# Patient Record
Sex: FEMALE | Race: BLACK OR AFRICAN AMERICAN | NOT HISPANIC OR LATINO | URBAN - METROPOLITAN AREA
[De-identification: names, ages, dates, MRNs, and addresses within clinical notes are randomized per-mention and may not be internally consistent; named-entity substitution may affect disease eponyms.]

---

## 2017-04-17 ENCOUNTER — EMERGENCY (EMERGENCY)
Facility: HOSPITAL | Age: 47
LOS: 0 days | Discharge: HOME | End: 2017-04-18
Attending: EMERGENCY MEDICINE | Admitting: EMERGENCY MEDICINE
Payer: MEDICAID

## 2017-04-17 VITALS
OXYGEN SATURATION: 100 % | DIASTOLIC BLOOD PRESSURE: 63 MMHG | SYSTOLIC BLOOD PRESSURE: 135 MMHG | TEMPERATURE: 98 F | RESPIRATION RATE: 16 BRPM | HEART RATE: 91 BPM

## 2017-04-17 VITALS — HEIGHT: 62 IN | WEIGHT: 149.91 LBS

## 2017-04-17 LAB
ALBUMIN SERPL ELPH-MCNC: 3.9 G/DL — SIGNIFICANT CHANGE UP (ref 3.3–5)
ALP SERPL-CCNC: 69 U/L — SIGNIFICANT CHANGE UP (ref 40–120)
ALT FLD-CCNC: 17 U/L — SIGNIFICANT CHANGE UP (ref 12–78)
ANION GAP SERPL CALC-SCNC: 6 MMOL/L — SIGNIFICANT CHANGE UP (ref 5–17)
ANISOCYTOSIS BLD QL: SLIGHT — SIGNIFICANT CHANGE UP
APPEARANCE UR: CLEAR — SIGNIFICANT CHANGE UP
AST SERPL-CCNC: 15 U/L — SIGNIFICANT CHANGE UP (ref 15–37)
BACTERIA # UR AUTO: (no result)
BASOPHILS # BLD AUTO: 0.1 K/UL — SIGNIFICANT CHANGE UP (ref 0–0.2)
BASOPHILS NFR BLD AUTO: 1 % — SIGNIFICANT CHANGE UP (ref 0–2)
BILIRUB SERPL-MCNC: 0.2 MG/DL — SIGNIFICANT CHANGE UP (ref 0.2–1.2)
BILIRUB UR-MCNC: NEGATIVE — SIGNIFICANT CHANGE UP
BUN SERPL-MCNC: 15 MG/DL — SIGNIFICANT CHANGE UP (ref 7–23)
CALCIUM SERPL-MCNC: 9.1 MG/DL — SIGNIFICANT CHANGE UP (ref 8.5–10.1)
CHLORIDE SERPL-SCNC: 105 MMOL/L — SIGNIFICANT CHANGE UP (ref 96–108)
CK SERPL-CCNC: 89 U/L — SIGNIFICANT CHANGE UP (ref 26–192)
CO2 SERPL-SCNC: 30 MMOL/L — SIGNIFICANT CHANGE UP (ref 22–31)
COLOR SPEC: YELLOW — SIGNIFICANT CHANGE UP
CREAT SERPL-MCNC: 0.85 MG/DL — SIGNIFICANT CHANGE UP (ref 0.5–1.3)
DIFF PNL FLD: (no result)
EOSINOPHIL # BLD AUTO: 0 K/UL — SIGNIFICANT CHANGE UP (ref 0–0.5)
EOSINOPHIL NFR BLD AUTO: 0.5 % — SIGNIFICANT CHANGE UP (ref 0–6)
EPI CELLS # UR: SIGNIFICANT CHANGE UP
GLUCOSE SERPL-MCNC: 183 MG/DL — HIGH (ref 70–99)
GLUCOSE UR QL: 100 MG/DL
HCT VFR BLD CALC: 36.4 % — SIGNIFICANT CHANGE UP (ref 34.5–45)
HGB BLD-MCNC: 11.4 G/DL — LOW (ref 11.5–15.5)
KETONES UR-MCNC: NEGATIVE — SIGNIFICANT CHANGE UP
LEUKOCYTE ESTERASE UR-ACNC: (no result)
LIDOCAIN IGE QN: 201 U/L — SIGNIFICANT CHANGE UP (ref 73–393)
LYMPHOCYTES # BLD AUTO: 1.3 K/UL — SIGNIFICANT CHANGE UP (ref 1–3.3)
LYMPHOCYTES # BLD AUTO: 23.4 % — SIGNIFICANT CHANGE UP (ref 13–44)
MANUAL DIF COMMENT BLD-IMP: SIGNIFICANT CHANGE UP
MCHC RBC-ENTMCNC: 26.3 PG — LOW (ref 27–34)
MCHC RBC-ENTMCNC: 31.3 GM/DL — LOW (ref 32–36)
MCV RBC AUTO: 84 FL — SIGNIFICANT CHANGE UP (ref 80–100)
MONOCYTES # BLD AUTO: 0.2 K/UL — SIGNIFICANT CHANGE UP (ref 0–0.9)
MONOCYTES NFR BLD AUTO: 4 % — SIGNIFICANT CHANGE UP (ref 2–14)
NEUTROPHILS # BLD AUTO: 3.9 K/UL — SIGNIFICANT CHANGE UP (ref 1.8–7.4)
NEUTROPHILS NFR BLD AUTO: 71.2 % — SIGNIFICANT CHANGE UP (ref 43–77)
NITRITE UR-MCNC: NEGATIVE — SIGNIFICANT CHANGE UP
PH UR: 8 — SIGNIFICANT CHANGE UP (ref 4.8–8)
PLAT MORPH BLD: NORMAL — SIGNIFICANT CHANGE UP
PLATELET # BLD AUTO: 212 K/UL — SIGNIFICANT CHANGE UP (ref 150–400)
POTASSIUM SERPL-MCNC: 3.9 MMOL/L — SIGNIFICANT CHANGE UP (ref 3.5–5.3)
POTASSIUM SERPL-SCNC: 3.9 MMOL/L — SIGNIFICANT CHANGE UP (ref 3.5–5.3)
PROT SERPL-MCNC: 7.6 GM/DL — SIGNIFICANT CHANGE UP (ref 6–8.3)
PROT UR-MCNC: NEGATIVE MG/DL — SIGNIFICANT CHANGE UP
RBC # BLD: 4.33 M/UL — SIGNIFICANT CHANGE UP (ref 3.8–5.2)
RBC # FLD: 12.2 % — SIGNIFICANT CHANGE UP (ref 10.3–14.5)
RBC BLD AUTO: (no result)
RBC CASTS # UR COMP ASSIST: SIGNIFICANT CHANGE UP /HPF (ref 0–4)
SODIUM SERPL-SCNC: 141 MMOL/L — SIGNIFICANT CHANGE UP (ref 135–145)
SP GR SPEC: 1.01 — SIGNIFICANT CHANGE UP (ref 1.01–1.02)
TROPONIN I SERPL-MCNC: <0.015 NG/ML — SIGNIFICANT CHANGE UP (ref 0.01–0.04)
UROBILINOGEN FLD QL: NEGATIVE MG/DL — SIGNIFICANT CHANGE UP
WBC # BLD: 5.5 K/UL — SIGNIFICANT CHANGE UP (ref 3.8–10.5)
WBC # FLD AUTO: 5.5 K/UL — SIGNIFICANT CHANGE UP (ref 3.8–10.5)
WBC UR QL: (no result)

## 2017-04-17 PROCEDURE — 71010: CPT | Mod: 26

## 2017-04-17 PROCEDURE — 76705 ECHO EXAM OF ABDOMEN: CPT | Mod: 26

## 2017-04-17 PROCEDURE — 93010 ELECTROCARDIOGRAM REPORT: CPT

## 2017-04-17 PROCEDURE — 99284 EMERGENCY DEPT VISIT MOD MDM: CPT | Mod: 25

## 2017-04-17 RX ORDER — FAMOTIDINE 10 MG/ML
20 INJECTION INTRAVENOUS ONCE
Refills: 0 | Status: COMPLETED | OUTPATIENT
Start: 2017-04-17 | End: 2017-04-17

## 2017-04-17 RX ORDER — SODIUM CHLORIDE 9 MG/ML
1000 INJECTION INTRAMUSCULAR; INTRAVENOUS; SUBCUTANEOUS ONCE
Refills: 0 | Status: COMPLETED | OUTPATIENT
Start: 2017-04-17 | End: 2017-04-17

## 2017-04-17 RX ORDER — SODIUM CHLORIDE 9 MG/ML
3 INJECTION INTRAMUSCULAR; INTRAVENOUS; SUBCUTANEOUS ONCE
Refills: 0 | Status: COMPLETED | OUTPATIENT
Start: 2017-04-17 | End: 2017-04-17

## 2017-04-17 RX ADMIN — SODIUM CHLORIDE 3 MILLILITER(S): 9 INJECTION INTRAMUSCULAR; INTRAVENOUS; SUBCUTANEOUS at 22:15

## 2017-04-17 RX ADMIN — FAMOTIDINE 20 MILLIGRAM(S): 10 INJECTION INTRAVENOUS at 22:15

## 2017-04-17 RX ADMIN — SODIUM CHLORIDE 1000 MILLILITER(S): 9 INJECTION INTRAMUSCULAR; INTRAVENOUS; SUBCUTANEOUS at 22:15

## 2017-04-17 NOTE — ED STATDOCS - GASTROINTESTINAL, MLM
abdomen soft, non-tender, and non-distended. Bowel sounds present. abdomen soft, +epigastric tenderness, and non-distended. Bowel sounds present.

## 2017-04-17 NOTE — ED STATDOCS - MEDICAL DECISION MAKING DETAILS
pt with abdominal pain described as burning, will do labs, us to r/o rubi, discussed with family  possible depression, pt is not suicidal or homicidal, pt to follow up with Tony.

## 2017-04-17 NOTE — ED STATDOCS - DETAILS:
I, Janiya Howard, performed the initial face to face bedside interview with this patient regarding history of present illness, review of symptoms and relevant past medical, social and family history.  I completed an independent physical examination.  I was the initial provider who evaluated this patient. The history, relevant review of systems, past medical and surgical history, medical decision making, and physical examination was documented by the scribe in my presence and I attest to the accuracy of the documentation.

## 2017-04-17 NOTE — ED STATDOCS - NS ED MD SCRIBE ATTENDING SCRIBE SECTIONS
HISTORY OF PRESENT ILLNESS/HIV/REVIEW OF SYSTEMS/PHYSICAL EXAM/RESULTS/PROGRESS NOTE/CONSULTATIONS/SHIFT CHANGE/DISPOSITION/PAST MEDICAL/SURGICAL/SOCIAL HISTORY/INTAKE ASSESSMENT/SCREENINGS

## 2017-04-17 NOTE — ED STATDOCS - CHPI ED SYMPTOM NEG
no burning urination/no fever/no hematuria/no abdominal distention/no blood in stool/no diarrhea/no dysuria/no palpitations

## 2017-04-18 RX ORDER — FAMOTIDINE 10 MG/ML
1 INJECTION INTRAVENOUS
Qty: 20 | Refills: 0
Start: 2017-04-18 | End: 2017-04-28

## 2017-04-19 DIAGNOSIS — M79.671 PAIN IN RIGHT FOOT: ICD-10-CM

## 2017-04-19 DIAGNOSIS — M79.672 PAIN IN LEFT FOOT: ICD-10-CM

## 2017-04-19 DIAGNOSIS — R10.9 UNSPECIFIED ABDOMINAL PAIN: ICD-10-CM

## 2017-05-24 ENCOUNTER — RESULT REVIEW (OUTPATIENT)
Age: 47
End: 2017-05-24

## 2017-05-31 ENCOUNTER — OUTPATIENT (OUTPATIENT)
Dept: OUTPATIENT SERVICES | Facility: HOSPITAL | Age: 47
LOS: 1 days | End: 2017-05-31
Payer: COMMERCIAL

## 2017-05-31 ENCOUNTER — APPOINTMENT (OUTPATIENT)
Dept: MAMMOGRAPHY | Facility: CLINIC | Age: 47
End: 2017-05-31
Payer: MEDICAID

## 2017-05-31 DIAGNOSIS — Z00.8 ENCOUNTER FOR OTHER GENERAL EXAMINATION: ICD-10-CM

## 2017-05-31 PROCEDURE — 77067 SCR MAMMO BI INCL CAD: CPT

## 2017-05-31 PROCEDURE — 77063 BREAST TOMOSYNTHESIS BI: CPT | Mod: 26

## 2017-05-31 PROCEDURE — 77063 BREAST TOMOSYNTHESIS BI: CPT

## 2017-05-31 PROCEDURE — G0202: CPT | Mod: 26

## 2017-11-02 NOTE — ED STATDOCS - OBJECTIVE STATEMENT
46 y/o F PMHx HTN takes amlodipine, presents to the ED c/o abd pain. The pt's daughter provides that she has +N and abd pain, and has heart burn which started today at about 7pm. Eating increases pain. No h/o fever, chills, dizziness, cp, cough, sob, headache, or v/d. 701

## 2017-11-28 ENCOUNTER — RECORD ABSTRACTING (OUTPATIENT)
Age: 47
End: 2017-11-28

## 2017-11-28 DIAGNOSIS — R73.03 PREDIABETES.: ICD-10-CM

## 2017-11-28 DIAGNOSIS — I10 ESSENTIAL (PRIMARY) HYPERTENSION: ICD-10-CM

## 2017-11-28 DIAGNOSIS — Z87.19 PERSONAL HISTORY OF OTHER DISEASES OF THE DIGESTIVE SYSTEM: ICD-10-CM

## 2017-11-28 RX ORDER — AMLODIPINE BESYLATE 5 MG/1
5 TABLET ORAL DAILY
Refills: 0 | Status: DISCONTINUED | COMMUNITY
Start: 2017-11-28 | End: 2017-11-28

## 2017-11-30 ENCOUNTER — APPOINTMENT (OUTPATIENT)
Dept: HEMATOLOGY ONCOLOGY | Facility: CLINIC | Age: 47
End: 2017-11-30
Payer: MEDICAID

## 2017-12-12 ENCOUNTER — LABORATORY RESULT (OUTPATIENT)
Age: 47
End: 2017-12-12

## 2017-12-12 ENCOUNTER — APPOINTMENT (OUTPATIENT)
Dept: HEMATOLOGY ONCOLOGY | Facility: CLINIC | Age: 47
End: 2017-12-12
Payer: MEDICAID

## 2017-12-12 VITALS
TEMPERATURE: 98.2 F | WEIGHT: 115 LBS | HEIGHT: 63 IN | SYSTOLIC BLOOD PRESSURE: 146 MMHG | BODY MASS INDEX: 20.38 KG/M2 | HEART RATE: 77 BPM | DIASTOLIC BLOOD PRESSURE: 84 MMHG

## 2017-12-12 LAB
HCT VFR BLD CALC: 39.3 %
HGB BLD-MCNC: 12.4 G/DL
MCHC RBC-ENTMCNC: 26.8 PG
MCHC RBC-ENTMCNC: 31.6 GM/DL
MCV RBC AUTO: 84.8 FL
PLATELET # BLD AUTO: 172 K/UL
RBC # BLD: 4.63 M/UL
RBC # FLD: 12.1 %
WBC # FLD AUTO: 3.3 K/UL

## 2017-12-12 PROCEDURE — 99204 OFFICE O/P NEW MOD 45 MIN: CPT | Mod: 25

## 2017-12-12 PROCEDURE — 36415 COLL VENOUS BLD VENIPUNCTURE: CPT

## 2017-12-12 PROCEDURE — 85025 COMPLETE CBC W/AUTO DIFF WBC: CPT

## 2017-12-14 LAB
ALBUMIN SERPL ELPH-MCNC: 4.3 G/DL
ALP BLD-CCNC: 70 U/L
ALT SERPL-CCNC: 15 U/L
ANION GAP SERPL CALC-SCNC: 15 MMOL/L
AST SERPL-CCNC: 19 U/L
BILIRUB SERPL-MCNC: 0.3 MG/DL
BUN SERPL-MCNC: 13 MG/DL
CALCIUM SERPL-MCNC: 10 MG/DL
CHLORIDE SERPL-SCNC: 105 MMOL/L
CO2 SERPL-SCNC: 26 MMOL/L
CREAT SERPL-MCNC: 0.91 MG/DL
EBV EA AB SER IA-ACNC: <5 U/ML
EBV EA AB TITR SER IF: POSITIVE
EBV EA IGG SER QL IA: 226 U/ML
EBV EA IGG SER-ACNC: NEGATIVE
EBV EA IGM SER IA-ACNC: NEGATIVE
EBV PATRN SPEC IB-IMP: NORMAL
EBV VCA IGG SER IA-ACNC: <10 U/ML
EBV VCA IGM SER QL IA: 15 U/ML
EPSTEIN-BARR VIRUS CAPSID ANTIGEN IGG: NEGATIVE
GLUCOSE SERPL-MCNC: 111 MG/DL
HAV IGM SER QL: NONREACTIVE
HAV IGM SER QL: NONREACTIVE
HBV CORE IGG+IGM SER QL: NONREACTIVE
HBV CORE IGM SER QL: NONREACTIVE
HBV CORE IGM SER QL: NONREACTIVE
HBV SURFACE AB SER QL: NONREACTIVE
HBV SURFACE AG SER QL: NONREACTIVE
HBV SURFACE AG SER QL: NONREACTIVE
HCV AB SER QL: NONREACTIVE
HCV S/CO RATIO: 0.16 S/CO
POTASSIUM SERPL-SCNC: 4.1 MMOL/L
PROT SERPL-MCNC: 7.4 G/DL
SODIUM SERPL-SCNC: 146 MMOL/L

## 2017-12-15 LAB — HTLV I+II AB SER QL: NORMAL

## 2017-12-18 LAB — HUMAN IMMUNODEFICIENCY VIRUS 1 (HIV-1) QUALITATIVE, RNA: NEGATIVE

## 2018-01-03 ENCOUNTER — APPOINTMENT (OUTPATIENT)
Dept: HEMATOLOGY ONCOLOGY | Facility: CLINIC | Age: 48
End: 2018-01-03

## 2018-01-16 ENCOUNTER — EMERGENCY (EMERGENCY)
Facility: HOSPITAL | Age: 48
LOS: 0 days | Discharge: HOME | End: 2018-01-16
Attending: EMERGENCY MEDICINE | Admitting: EMERGENCY MEDICINE
Payer: MEDICAID

## 2018-01-16 VITALS
OXYGEN SATURATION: 100 % | DIASTOLIC BLOOD PRESSURE: 83 MMHG | HEART RATE: 84 BPM | TEMPERATURE: 98 F | RESPIRATION RATE: 16 BRPM | SYSTOLIC BLOOD PRESSURE: 136 MMHG

## 2018-01-16 VITALS
DIASTOLIC BLOOD PRESSURE: 83 MMHG | OXYGEN SATURATION: 100 % | SYSTOLIC BLOOD PRESSURE: 125 MMHG | TEMPERATURE: 98 F | RESPIRATION RATE: 18 BRPM | HEART RATE: 73 BPM

## 2018-01-16 DIAGNOSIS — R10.9 UNSPECIFIED ABDOMINAL PAIN: ICD-10-CM

## 2018-01-16 DIAGNOSIS — K21.9 GASTRO-ESOPHAGEAL REFLUX DISEASE WITHOUT ESOPHAGITIS: ICD-10-CM

## 2018-01-16 DIAGNOSIS — M54.12 RADICULOPATHY, CERVICAL REGION: ICD-10-CM

## 2018-01-16 LAB
ADD ON TEST-SPECIMEN IN LAB: SIGNIFICANT CHANGE UP
ALBUMIN SERPL ELPH-MCNC: 3.9 G/DL — SIGNIFICANT CHANGE UP (ref 3.3–5)
ALP SERPL-CCNC: 72 U/L — SIGNIFICANT CHANGE UP (ref 40–120)
ALT FLD-CCNC: 26 U/L — SIGNIFICANT CHANGE UP (ref 12–78)
ANION GAP SERPL CALC-SCNC: 9 MMOL/L — SIGNIFICANT CHANGE UP (ref 5–17)
APTT BLD: 35 SEC — SIGNIFICANT CHANGE UP (ref 27.5–37.4)
AST SERPL-CCNC: 15 U/L — SIGNIFICANT CHANGE UP (ref 15–37)
BASOPHILS # BLD AUTO: 0 K/UL — SIGNIFICANT CHANGE UP (ref 0–0.2)
BILIRUB SERPL-MCNC: 0.4 MG/DL — SIGNIFICANT CHANGE UP (ref 0.2–1.2)
BLD GP AB SCN SERPL QL: SIGNIFICANT CHANGE UP
BUN SERPL-MCNC: 16 MG/DL — SIGNIFICANT CHANGE UP (ref 7–23)
CALCIUM SERPL-MCNC: 9.2 MG/DL — SIGNIFICANT CHANGE UP (ref 8.5–10.1)
CHLORIDE SERPL-SCNC: 106 MMOL/L — SIGNIFICANT CHANGE UP (ref 96–108)
CK SERPL-CCNC: 91 U/L — SIGNIFICANT CHANGE UP (ref 26–192)
CO2 SERPL-SCNC: 28 MMOL/L — SIGNIFICANT CHANGE UP (ref 22–31)
CREAT SERPL-MCNC: 0.81 MG/DL — SIGNIFICANT CHANGE UP (ref 0.5–1.3)
EOSINOPHIL # BLD AUTO: 0 K/UL — SIGNIFICANT CHANGE UP (ref 0–0.5)
EOSINOPHIL NFR BLD AUTO: 2 % — SIGNIFICANT CHANGE UP (ref 0–6)
GLUCOSE SERPL-MCNC: 108 MG/DL — HIGH (ref 70–99)
HCT VFR BLD CALC: 39.5 % — SIGNIFICANT CHANGE UP (ref 34.5–45)
HGB BLD-MCNC: 12.5 G/DL — SIGNIFICANT CHANGE UP (ref 11.5–15.5)
INR BLD: 1.03 RATIO — SIGNIFICANT CHANGE UP (ref 0.88–1.16)
LIDOCAIN IGE QN: 181 U/L — SIGNIFICANT CHANGE UP (ref 73–393)
LYMPHOCYTES # BLD AUTO: 1.6 K/UL — SIGNIFICANT CHANGE UP (ref 1–3.3)
LYMPHOCYTES # BLD AUTO: 50 % — HIGH (ref 13–44)
MANUAL DIF COMMENT BLD-IMP: SIGNIFICANT CHANGE UP
MCHC RBC-ENTMCNC: 26.5 PG — LOW (ref 27–34)
MCHC RBC-ENTMCNC: 31.7 GM/DL — LOW (ref 32–36)
MCV RBC AUTO: 83.7 FL — SIGNIFICANT CHANGE UP (ref 80–100)
MONOCYTES # BLD AUTO: 0.2 K/UL — SIGNIFICANT CHANGE UP (ref 0–0.9)
MONOCYTES NFR BLD AUTO: 1 % — LOW (ref 2–14)
NEUTROPHILS # BLD AUTO: 1 K/UL — LOW (ref 1.8–7.4)
NEUTROPHILS NFR BLD AUTO: 42 % — LOW (ref 43–77)
PLAT MORPH BLD: NORMAL — SIGNIFICANT CHANGE UP
PLATELET # BLD AUTO: 213 K/UL — SIGNIFICANT CHANGE UP (ref 150–400)
POTASSIUM SERPL-MCNC: 3.7 MMOL/L — SIGNIFICANT CHANGE UP (ref 3.5–5.3)
POTASSIUM SERPL-SCNC: 3.7 MMOL/L — SIGNIFICANT CHANGE UP (ref 3.5–5.3)
PROT SERPL-MCNC: 8 GM/DL — SIGNIFICANT CHANGE UP (ref 6–8.3)
PROTHROM AB SERPL-ACNC: 11.1 SEC — SIGNIFICANT CHANGE UP (ref 9.8–12.7)
RBC # BLD: 4.72 M/UL — SIGNIFICANT CHANGE UP (ref 3.8–5.2)
RBC # FLD: 12.5 % — SIGNIFICANT CHANGE UP (ref 10.3–14.5)
RBC BLD AUTO: NORMAL — SIGNIFICANT CHANGE UP
SODIUM SERPL-SCNC: 143 MMOL/L — SIGNIFICANT CHANGE UP (ref 135–145)
TROPONIN I SERPL-MCNC: <0.015 NG/ML — SIGNIFICANT CHANGE UP (ref 0.01–0.04)
TYPE + AB SCN PNL BLD: SIGNIFICANT CHANGE UP
VARIANT LYMPHS # BLD: 5 % — SIGNIFICANT CHANGE UP (ref 0–6)
WBC # BLD: 3 K/UL — LOW (ref 3.8–10.5)
WBC # FLD AUTO: 3 K/UL — LOW (ref 3.8–10.5)

## 2018-01-16 PROCEDURE — 72125 CT NECK SPINE W/O DYE: CPT | Mod: 26

## 2018-01-16 PROCEDURE — 99222 1ST HOSP IP/OBS MODERATE 55: CPT

## 2018-01-16 PROCEDURE — 71045 X-RAY EXAM CHEST 1 VIEW: CPT | Mod: 26

## 2018-01-16 PROCEDURE — 93010 ELECTROCARDIOGRAM REPORT: CPT

## 2018-01-16 PROCEDURE — 70450 CT HEAD/BRAIN W/O DYE: CPT | Mod: 26

## 2018-01-16 PROCEDURE — 99285 EMERGENCY DEPT VISIT HI MDM: CPT

## 2018-01-16 RX ORDER — FAMOTIDINE 10 MG/ML
20 INJECTION INTRAVENOUS ONCE
Refills: 0 | Status: COMPLETED | OUTPATIENT
Start: 2018-01-16 | End: 2018-01-16

## 2018-01-16 RX ORDER — FAMOTIDINE 10 MG/ML
1 INJECTION INTRAVENOUS
Qty: 28 | Refills: 0
Start: 2018-01-16 | End: 2018-01-29

## 2018-01-16 RX ADMIN — FAMOTIDINE 20 MILLIGRAM(S): 10 INJECTION INTRAVENOUS at 11:02

## 2018-01-16 NOTE — ED PROVIDER NOTE - CONSTITUTIONAL, MLM
normal... Adult Black Female, Well appearing, well nourished, awake, alert, oriented to person, place, time/situation and in no apparent distress. Non toxic Adult Black Female, Well appearing, well nourished, awake, alert, oriented to person, place, time/situation and in no apparent respiratory distress. Non toxic

## 2018-01-16 NOTE — ED PROVIDER NOTE - ENMT, MLM
Airway patent, Nasal mucosa clear. Mouth with normal mucosa. Throat has no vesicles, no oropharyngeal exudates and uvula is midline. Airway patent, Nasal mucosa clear. Mouth with mildly dry mucosa. Throat has no vesicles, no oropharyngeal exudates and uvula is midline. Neck none tender. AFROM without SX

## 2018-01-16 NOTE — ED PROVIDER NOTE - OBJECTIVE STATEMENT
47 y-o F with PMHX of HTN presents to the ED c/o of ABD pain / weakness. Pt notes a sudden onset of burning ABD pain at 8 am this morning. Pt states she felt a mild HA last night and this morning RUE and B/L LEs feeling weak. Weakness is intermittent and has felt this before. Not currently having a HA. Denies CP, SOB, N/V/D, PMD Irlanda Granger. Pt poor historian, Hx obtained from Son at bedside. 47 y-o F with PMHX of HTN presents to the ED c/o of ABD pain / weakness. Pt notes a sudden onset of burning ABD pain at 8 am this morning. Pt states she felt a mild HA last night and this morning RUE and B/L LEs feeling weak. Weakness is intermittent and has felt this before x 3 months. Not currently having a HA. Has had piror vertigo episodes, none recently.  Denies CP, SOB, N/V/D, PMD Irlanda Granger. Pt poor historian, Hx obtained from Son at bedside.

## 2018-01-16 NOTE — ED PROVIDER NOTE - PROGRESS NOTE DETAILS
Marcella Maza: Pt with Mendoza last night, not sever, awoke this morning 6 am, noted right arm weakness intermittent of same before, Pt is not code stroke candidate due to Sx greater then 48 hours. Marcella Maza: Pt with Mendoza last night, not severe, awoke this morning 6 am, noted right arm weakness intermittent of same before, Pt is not code stroke candidate due to Sx greater then 48 hours. Neuro PA at bedside. Dr. Graham:  Reevaluated patient at bedside.  Patient feeling much improved: no focal arm weak/numb, no abd discomfort, + tolerating po well, CT c-spine no acute pathology.  Discussed the results of all diagnostic testing in ED and copies of all reports given.   An opportunity to ask questions was given.  Discussed the importance of prompt, close medical follow-up.  Patient will return with any changes, concerns or persistent / worsening symptoms.  Understanding of all instructions verbalized.

## 2018-01-16 NOTE — ED ADULT TRIAGE NOTE - CHIEF COMPLAINT QUOTE
c/o abdominal "burn", dizziness, right sided weakness since 6 am this morning, body ache. Denies vomiting diarrhea. hx HTN. No facial droop noted, patient ambulating at this time.

## 2018-01-16 NOTE — ED PROVIDER NOTE - MEDICAL DECISION MAKING DETAILS
47 y-o F presents with RUE weakness and B/L LE weakness. Weakness has been intermittent x 3months  Pt c/o burning pain sensation in her ABD.   Plan: Labs, Pepcid, Monitor, Ct Brain Stroke call work up, CXR, EKG, observe and reassess.

## 2018-01-16 NOTE — ED PROVIDER NOTE - DIAGNOSIS COUNSELING, MDM
conducted a detailed discussion... I had a detailed discussion with the patient and son regarding the historical points, exam findings, and any diagnostic results supporting the discharge diagnosis.

## 2018-01-16 NOTE — CONSULT NOTE ADULT - SUBJECTIVE AND OBJECTIVE BOX
CC: Stomach pain, tongue burning, right arm and bilateral leg weakness     HPI: Pt is a Moroccan-Creole speaking 47 year old woman with a PMH of HTN who presented to the ED with stomach pains, a burning tongue and RUE weakness. Her son was at bedside and available for translation. As per the patient she has had these symptoms for some time but today she didn't feel "well" so her son brought her to the ED.  The patient gives a history of right hand numbness with pain in her neck, right shoulder and right arm. She also c/o bilateral lower extremity weakness. Pt denies any recent illness.     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      Vital Signs Last 24 Hrs  T(C): 36.7 (16 Jan 2018 09:10), Max: 36.7 (16 Jan 2018 09:10)  T(F): 98.1 (16 Jan 2018 09:10), Max: 98.1 (16 Jan 2018 09:10)  HR: 84 (16 Jan 2018 09:10) (84 - 84)  BP: 136/83 (16 Jan 2018 09:10) (136/83 - 136/83)  RR: 16 (16 Jan 2018 09:10) (16 - 16)  SpO2: 100% (16 Jan 2018 09:10) (100% - 100%)    PHYSICAL EXAM:  Constitutional: awake and alert  HEENT: PERRLA, EOMI  Neck: Supple  Respiratory: Breath sounds are clear bilaterally  Cardiovascular: S1 and S2, regular rhythm  Gastrointestinal: soft, nontender  Extremities:  no edema  Vascular: Carotid Bruit - no  Musculoskeletal: no joint swelling/tenderness, no abnormal movements  Skin: No rashes    Neurological exam:  HF: A x O x 3. Appropriately interactive, Pt speaks Moroccan/Creole- intact as per son   CN: JONO, EOMI, VFF, facial sensation normal, no NLFD, tongue midline, Palate moves equally, SCM equal bilaterally  Motor: No pronator drift, Strength 5/5 in all 4 ext, normal bulk and tone, no tremor, rigidity or bradykinesia.    Sens: Intact to light touch / PP/ VS/ JS    Reflexes: Symmetric and normal . BJ 2+, BR 2+, KJ 2+, AJ 2+, downgoing toes b/l  Coord:  No FNFA, dysmetria, BLAS intact   Gait/Balance: Normal/Cannot test    NIHSS: 0    Labs:                        12.5   3.0   )-----------( 213      ( 16 Jan 2018 09:28 )             39.5     01-16    143  |  106  |  16  ---------------------<  108<H>  3.7   |  28  |  0.81    Ca    9.2      16 Jan 2018 09:28    TPro  8.0  /  Alb  3.9  /  TBili  0.4  /  DBili  x   /  AST  15  /  ALT  26  /  AlkPhos  72  01-16    PT/INR - ( 16 Jan 2018 09:28 )   PT: 11.1 sec;   INR: 1.03 ratio      PTT - ( 16 Jan 2018 09:28 )  PTT:35.0 sec    RADIOLOGY:  CT HEAD-  The brain demonstrates no abnormal attenuation.     No acute cerebral cortical infarct is seen.    No intracranial hemorrhage is found.    No mass effect is found in the brain. CC: Stomach pain, tongue burning, right arm and bilateral leg weakness     HPI: 47 year old Albanian-Creole speaking woman with a PMH of HTN presented to the ED with stomach pains, a burning tongue and RUE weakness,. Her son was at bedside is available for translation. As per the patient she has had these symptoms for some time but today she didn't feel "well" so her son brought her to the ED.  The patient gives a history of right hand numbness with pain neck, radiates to right shoulder and right arm. She also c/o bilateral lower extremity weakness off and on; initially Code stroke called by ED physician due to right arm numbness/weakness; sx resolved, pt had non-focal exam, hence Code stroke d/c.    Pt denies any recent illness, trauma to neck, visual or speech disturbance.     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      Vital Signs Last 24 Hrs  T(C): 36.7 (16 Jan 2018 09:10), Max: 36.7 (16 Jan 2018 09:10)  T(F): 98.1 (16 Jan 2018 09:10), Max: 98.1 (16 Jan 2018 09:10)  HR: 84 (16 Jan 2018 09:10) (84 - 84)  BP: 136/83 (16 Jan 2018 09:10) (136/83 - 136/83)  RR: 16 (16 Jan 2018 09:10) (16 - 16)  SpO2: 100% (16 Jan 2018 09:10) (100% - 100%)    PHYSICAL EXAM:  Constitutional: awake and alert  HEENT: PERRLA, EOMI  Neck: Supple  Respiratory: Breath sounds are clear bilaterally  Cardiovascular: S1 and S2, regular rhythm  Extremities:  no edema  Vascular: Carotid Bruit - no  Musculoskeletal: no joint swelling/tenderness, no abnormal movements  Skin: No rashes    Neurological exam:  HF: A x O x 3. Appropriately interactive, Pt speaks Albanian/Creole- intact as per son   CN: JONO, EOMI, VFF, facial sensation normal, no NLFD, tongue midline, Palate moves equally, SCM equal bilaterally  Motor: No pronator drift, Strength 5/5 in all 4 ext, normal bulk and tone, no tremor, rigidity or bradykinesia.    Sens: Intact to light touch / PP/ VS/ JS    Reflexes: Symmetric and normal .  downgoing toes b/l  Coord:  No FNFA, dysmetria   Gait/Balance: Not test    NIHSS: 0    Labs:                        12.5   3.0   )-----------( 213      ( 16 Jan 2018 09:28 )             39.5     01-16    143  |  106  |  16  ---------------------<  108<H>  3.7   |  28  |  0.81    Ca    9.2      16 Jan 2018 09:28    TPro  8.0  /  Alb  3.9  /  TBili  0.4  /  DBili  x   /  AST  15  /  ALT  26  /  AlkPhos  72  01-16    PT/INR - ( 16 Jan 2018 09:28 )   PT: 11.1 sec;   INR: 1.03 ratio      PTT - ( 16 Jan 2018 09:28 )  PTT:35.0 sec    RADIOLOGY:  CT HEAD-  The brain demonstrates no abnormal attenuation.     No acute cerebral cortical infarct is seen.    No intracranial hemorrhage is found.    No mass effect is found in the brain.

## 2018-01-16 NOTE — CONSULT NOTE ADULT - ASSESSMENT
Pt is a Syriac-Creole speaking 47 year old woman with a PMH of HTN who presented to the ED with stomach pains, a burning tongue and RUE weakness. The ED physician noticed some right hand weakness and called a code stroke at 9:44am- pt went for CT of the head which was negative for any acute stroke or hemorrhage.   Pt was seen and examined after her CT- no focal weakness noted. Pt gives no time of onset for the symptoms, states they have been going on for some time. Not a candidate for t-PA.    # TIA vs cervical radiculopathy  -not a t-PA candidate  -Recommend CT of the cervical spine 47 year old Tunisian-Creole speaking with PMH of HTN who presented to the ED with stomach pains, a burning tongue and RUE weakness; initially ED physician noticed some right hand weakness and called a code stroke at 9:44 am, CT of the head which was negative for any acute stroke or hemorrhage; pt was reexamined after her CT- no focal weakness noted, pt has had neck pain radiating to arms of chronic nature; at times radiates to both arms and has some sx in legs.    Pt was evaluated, not a candidate for t-PA; non-focal exam.    # TIA less likely.    # cervical radiculopathy vs peripheral neuropathy / CTS    -Recommend CT of the cervical spine   -labs: B12/Fol/Aic    NEUROLOGY ATTENDING  ------------------------------  Pt seen and examined.  Agree with above plan    D/W pt, her son and Dr. Graham

## 2018-01-16 NOTE — ED PROVIDER NOTE - CARE PLAN
Principal Discharge DX:	Gastroesophageal reflux disease, esophagitis presence not specified  Secondary Diagnosis:	Cervical radiculopathy, chronic

## 2018-01-23 ENCOUNTER — APPOINTMENT (OUTPATIENT)
Dept: HEMATOLOGY ONCOLOGY | Facility: CLINIC | Age: 48
End: 2018-01-23
Payer: MEDICAID

## 2018-01-23 VITALS
TEMPERATURE: 98.1 F | SYSTOLIC BLOOD PRESSURE: 131 MMHG | HEART RATE: 80 BPM | WEIGHT: 120 LBS | DIASTOLIC BLOOD PRESSURE: 83 MMHG | BODY MASS INDEX: 21.26 KG/M2 | HEIGHT: 63 IN

## 2018-01-23 DIAGNOSIS — K29.70 GASTRITIS, UNSPECIFIED, W/OUT BLEEDING: ICD-10-CM

## 2018-01-23 PROCEDURE — 99214 OFFICE O/P EST MOD 30 MIN: CPT

## 2018-01-23 RX ORDER — NAPROXEN 250 MG/1
250 TABLET ORAL
Qty: 28 | Refills: 0 | Status: DISCONTINUED | COMMUNITY
Start: 2017-08-22

## 2018-01-23 RX ORDER — RANITIDINE 150 MG/1
150 TABLET ORAL
Qty: 30 | Refills: 0 | Status: DISCONTINUED | COMMUNITY
Start: 2017-07-21

## 2018-01-23 RX ORDER — POLYETHYLENE GLYCOL 3350 17 G/17G
17 POWDER, FOR SOLUTION ORAL
Qty: 238 | Refills: 0 | Status: DISCONTINUED | COMMUNITY
Start: 2017-07-21

## 2018-01-23 RX ORDER — FAMOTIDINE 20 MG/1
20 TABLET, FILM COATED ORAL
Qty: 28 | Refills: 0 | Status: DISCONTINUED | COMMUNITY
Start: 2018-01-16 | End: 2018-01-23

## 2018-06-05 ENCOUNTER — OUTPATIENT (OUTPATIENT)
Dept: OUTPATIENT SERVICES | Facility: HOSPITAL | Age: 48
LOS: 1 days | End: 2018-06-05
Payer: COMMERCIAL

## 2018-06-05 ENCOUNTER — APPOINTMENT (OUTPATIENT)
Dept: MAMMOGRAPHY | Facility: CLINIC | Age: 48
End: 2018-06-05
Payer: MEDICAID

## 2018-06-05 DIAGNOSIS — Z00.8 ENCOUNTER FOR OTHER GENERAL EXAMINATION: ICD-10-CM

## 2018-06-05 PROCEDURE — 77067 SCR MAMMO BI INCL CAD: CPT

## 2018-06-05 PROCEDURE — 77067 SCR MAMMO BI INCL CAD: CPT | Mod: 26

## 2018-06-05 PROCEDURE — 77063 BREAST TOMOSYNTHESIS BI: CPT

## 2018-06-05 PROCEDURE — 77063 BREAST TOMOSYNTHESIS BI: CPT | Mod: 26

## 2018-06-11 ENCOUNTER — APPOINTMENT (OUTPATIENT)
Dept: CARDIOLOGY | Facility: CLINIC | Age: 48
End: 2018-06-11
Payer: COMMERCIAL

## 2018-06-11 DIAGNOSIS — R01.1 CARDIAC MURMUR, UNSPECIFIED: ICD-10-CM

## 2018-06-11 PROCEDURE — 93000 ELECTROCARDIOGRAM COMPLETE: CPT

## 2018-06-11 PROCEDURE — 99204 OFFICE O/P NEW MOD 45 MIN: CPT

## 2018-07-02 ENCOUNTER — FORM ENCOUNTER (OUTPATIENT)
Age: 48
End: 2018-07-02

## 2018-07-03 ENCOUNTER — OUTPATIENT (OUTPATIENT)
Dept: OUTPATIENT SERVICES | Facility: HOSPITAL | Age: 48
LOS: 1 days | Discharge: HOME | End: 2018-07-03
Payer: MEDICAID

## 2018-07-03 DIAGNOSIS — R01.1 CARDIAC MURMUR, UNSPECIFIED: ICD-10-CM

## 2018-07-03 DIAGNOSIS — R07.9 CHEST PAIN, UNSPECIFIED: ICD-10-CM

## 2018-07-03 PROCEDURE — 93016 CV STRESS TEST SUPVJ ONLY: CPT

## 2018-07-03 PROCEDURE — 93306 TTE W/DOPPLER COMPLETE: CPT | Mod: 26

## 2018-07-03 PROCEDURE — 93018 CV STRESS TEST I&R ONLY: CPT

## 2018-08-06 VITALS
BODY MASS INDEX: 21.97 KG/M2 | WEIGHT: 124 LBS | HEART RATE: 90 BPM | OXYGEN SATURATION: 100 % | DIASTOLIC BLOOD PRESSURE: 95 MMHG | SYSTOLIC BLOOD PRESSURE: 156 MMHG | HEIGHT: 63 IN

## 2018-08-06 RX ORDER — OMEPRAZOLE 20 MG/1
20 CAPSULE, DELAYED RELEASE ORAL
Qty: 30 | Refills: 0 | Status: DISCONTINUED | COMMUNITY
Start: 2017-08-09 | End: 2018-08-06

## 2019-01-24 NOTE — ED PROVIDER NOTE - NIH STROKE SCALE: 6A. MOTOR LEG, LEFT, QM
Detail Level: Zone Plan: If lesions persist after liquid nitrogen, okay to use Efudex as a spot treatment- has prescription at home (0) No drift; leg holds 30 degree position for full 5 secs

## 2019-02-08 ENCOUNTER — RESULT REVIEW (OUTPATIENT)
Age: 49
End: 2019-02-08

## 2019-04-08 ENCOUNTER — EMERGENCY (EMERGENCY)
Facility: HOSPITAL | Age: 49
LOS: 0 days | Discharge: HOME | End: 2019-04-08
Attending: EMERGENCY MEDICINE | Admitting: EMERGENCY MEDICINE
Payer: COMMERCIAL

## 2019-04-08 VITALS
TEMPERATURE: 98 F | RESPIRATION RATE: 17 BRPM | DIASTOLIC BLOOD PRESSURE: 70 MMHG | SYSTOLIC BLOOD PRESSURE: 135 MMHG | HEART RATE: 80 BPM | OXYGEN SATURATION: 97 %

## 2019-04-08 VITALS — WEIGHT: 145.06 LBS | HEIGHT: 64 IN

## 2019-04-08 DIAGNOSIS — I10 ESSENTIAL (PRIMARY) HYPERTENSION: ICD-10-CM

## 2019-04-08 DIAGNOSIS — R20.2 PARESTHESIA OF SKIN: ICD-10-CM

## 2019-04-08 LAB
ALBUMIN SERPL ELPH-MCNC: 4.1 G/DL — SIGNIFICANT CHANGE UP (ref 3.3–5)
ALP SERPL-CCNC: 89 U/L — SIGNIFICANT CHANGE UP (ref 40–120)
ALT FLD-CCNC: 29 U/L — SIGNIFICANT CHANGE UP (ref 12–78)
ANION GAP SERPL CALC-SCNC: 7 MMOL/L — SIGNIFICANT CHANGE UP (ref 5–17)
APPEARANCE UR: CLEAR — SIGNIFICANT CHANGE UP
AST SERPL-CCNC: 27 U/L — SIGNIFICANT CHANGE UP (ref 15–37)
BASOPHILS # BLD AUTO: 0.02 K/UL — SIGNIFICANT CHANGE UP (ref 0–0.2)
BASOPHILS NFR BLD AUTO: 0.5 % — SIGNIFICANT CHANGE UP (ref 0–2)
BILIRUB SERPL-MCNC: 0.3 MG/DL — SIGNIFICANT CHANGE UP (ref 0.2–1.2)
BILIRUB UR-MCNC: NEGATIVE — SIGNIFICANT CHANGE UP
BUN SERPL-MCNC: 12 MG/DL — SIGNIFICANT CHANGE UP (ref 7–23)
CALCIUM SERPL-MCNC: 9.6 MG/DL — SIGNIFICANT CHANGE UP (ref 8.5–10.1)
CHLORIDE SERPL-SCNC: 107 MMOL/L — SIGNIFICANT CHANGE UP (ref 96–108)
CO2 SERPL-SCNC: 29 MMOL/L — SIGNIFICANT CHANGE UP (ref 22–31)
COLOR SPEC: YELLOW — SIGNIFICANT CHANGE UP
CREAT SERPL-MCNC: 0.91 MG/DL — SIGNIFICANT CHANGE UP (ref 0.5–1.3)
DIFF PNL FLD: ABNORMAL
EOSINOPHIL # BLD AUTO: 0.02 K/UL — SIGNIFICANT CHANGE UP (ref 0–0.5)
EOSINOPHIL NFR BLD AUTO: 0.5 % — SIGNIFICANT CHANGE UP (ref 0–6)
EPI CELLS # UR: SIGNIFICANT CHANGE UP
GLUCOSE SERPL-MCNC: 124 MG/DL — HIGH (ref 70–99)
GLUCOSE UR QL: NEGATIVE MG/DL — SIGNIFICANT CHANGE UP
HCT VFR BLD CALC: 40.8 % — SIGNIFICANT CHANGE UP (ref 34.5–45)
HGB BLD-MCNC: 13.2 G/DL — SIGNIFICANT CHANGE UP (ref 11.5–15.5)
IMM GRANULOCYTES NFR BLD AUTO: 0 % — SIGNIFICANT CHANGE UP (ref 0–1.5)
KETONES UR-MCNC: NEGATIVE — SIGNIFICANT CHANGE UP
LEUKOCYTE ESTERASE UR-ACNC: ABNORMAL
LYMPHOCYTES # BLD AUTO: 2.12 K/UL — SIGNIFICANT CHANGE UP (ref 1–3.3)
LYMPHOCYTES # BLD AUTO: 48.6 % — HIGH (ref 13–44)
MAGNESIUM SERPL-MCNC: 2.1 MG/DL — SIGNIFICANT CHANGE UP (ref 1.6–2.6)
MCHC RBC-ENTMCNC: 27.2 PG — SIGNIFICANT CHANGE UP (ref 27–34)
MCHC RBC-ENTMCNC: 32.4 GM/DL — SIGNIFICANT CHANGE UP (ref 32–36)
MCV RBC AUTO: 84 FL — SIGNIFICANT CHANGE UP (ref 80–100)
MONOCYTES # BLD AUTO: 0.23 K/UL — SIGNIFICANT CHANGE UP (ref 0–0.9)
MONOCYTES NFR BLD AUTO: 5.3 % — SIGNIFICANT CHANGE UP (ref 2–14)
NEUTROPHILS # BLD AUTO: 1.97 K/UL — SIGNIFICANT CHANGE UP (ref 1.8–7.4)
NEUTROPHILS NFR BLD AUTO: 45.1 % — SIGNIFICANT CHANGE UP (ref 43–77)
NITRITE UR-MCNC: NEGATIVE — SIGNIFICANT CHANGE UP
NRBC # BLD: 0 /100 WBCS — SIGNIFICANT CHANGE UP (ref 0–0)
PH UR: 8 — SIGNIFICANT CHANGE UP (ref 5–8)
PLATELET # BLD AUTO: 235 K/UL — SIGNIFICANT CHANGE UP (ref 150–400)
POTASSIUM SERPL-MCNC: 4.1 MMOL/L — SIGNIFICANT CHANGE UP (ref 3.5–5.3)
POTASSIUM SERPL-SCNC: 4.1 MMOL/L — SIGNIFICANT CHANGE UP (ref 3.5–5.3)
PROT SERPL-MCNC: 8.6 GM/DL — HIGH (ref 6–8.3)
PROT UR-MCNC: NEGATIVE MG/DL — SIGNIFICANT CHANGE UP
RBC # BLD: 4.86 M/UL — SIGNIFICANT CHANGE UP (ref 3.8–5.2)
RBC # FLD: 13.1 % — SIGNIFICANT CHANGE UP (ref 10.3–14.5)
RBC CASTS # UR COMP ASSIST: ABNORMAL /HPF (ref 0–4)
SODIUM SERPL-SCNC: 143 MMOL/L — SIGNIFICANT CHANGE UP (ref 135–145)
SP GR SPEC: 1.01 — SIGNIFICANT CHANGE UP (ref 1.01–1.02)
TROPONIN I SERPL-MCNC: <0.015 NG/ML — SIGNIFICANT CHANGE UP (ref 0.01–0.04)
UROBILINOGEN FLD QL: NEGATIVE MG/DL — SIGNIFICANT CHANGE UP
WBC # BLD: 4.36 K/UL — SIGNIFICANT CHANGE UP (ref 3.8–10.5)
WBC # FLD AUTO: 4.36 K/UL — SIGNIFICANT CHANGE UP (ref 3.8–10.5)
WBC UR QL: SIGNIFICANT CHANGE UP

## 2019-04-08 PROCEDURE — 71046 X-RAY EXAM CHEST 2 VIEWS: CPT | Mod: 26

## 2019-04-08 PROCEDURE — 70450 CT HEAD/BRAIN W/O DYE: CPT | Mod: 26

## 2019-04-08 PROCEDURE — 93010 ELECTROCARDIOGRAM REPORT: CPT

## 2019-04-08 PROCEDURE — 99285 EMERGENCY DEPT VISIT HI MDM: CPT | Mod: 25

## 2019-04-08 NOTE — ED STATDOCS - PROGRESS NOTE DETAILS
signed Amanda Esposito PA-C Pt seen initially in intake by Dr Marshall.  ID [id number] Ora Woodward.   48F c/o right cheek "cramping" since last night and right sided intermittent weakness x 1 year which pt has seen her doctor for and has had outpt tests. pt also has a business card from a cardiologist in [facility] who she states she saw in december who said she needs to see a neurologist but she hasn't seen one yet. Pt denies CP/SOB/confusion/difficulty walking. pt felt like her BP was high today, took her 5mg amplodipine this morning. GCS 15. Pt alert, NAD, ambulates with ease in ED. normocephalic atraumatic. no spinal tenderness. CN II-XII grossly intact. neg romberg, neg pronator drift, normal gait including tandem. pupils PERRL 2mm bilat, EOMI. no gross visual field deficit. no dysmetria, neg finger to nose, heel to shin. Plan labs, CT head, EKG, cxr, re-eval. Pt agrees with plan of  care. PMD Irlanda Granger signed Amanda Esposito PA-C  ID [id number] signed Amanda Esposito PA-C  ID [id number]  No significant findings on labs, imaging, or EKG. pt with normal neuro exam in ED. recommend outpt f/u neuro. 1 trop drawn since symptoms started last night. Pt asking about additional testing and whther her medication is causing her symptoms. Advise pt to f/u with neuro and PMD. return precautions given. pt given copy of labwork and imaging results. Pt ambulates with ease unassisted in ED. Pt feeling well at DC, agrees with DC and plan of care.

## 2019-04-08 NOTE — ED STATDOCS - PROVIDER TOKENS
PROVIDER:[TOKEN:[[id number]],FOLLOWUP:[1-3 Days]],PROVIDER:[TOKEN:[[id number]],FOLLOWUP:[1-3 Days]]

## 2019-04-08 NOTE — ED STATDOCS - NEUROLOGICAL, MLM
sensation is normal and strength is normal. Cranial Nerves II-XII intact. No pronator drift. Normal finger-nose-finger. No limb ataxia

## 2019-04-08 NOTE — ED ADULT TRIAGE NOTE - CHIEF COMPLAINT QUOTE
patient checked bp and thought pressure was high bp in triage 162/96 HR 89.  Patient had been compliant with amlodipine 5mg.  She reports a "cramp" on the right side of her face, that started this morning.  denies HA.  smile equal, tongue midline

## 2019-04-08 NOTE — ED STATDOCS - CARE PROVIDERS DIRECT ADDRESSES
[email or web address]

## 2019-04-08 NOTE — ED STATDOCS - CARE PROVIDER_API CALL
Sam Lacey)  Neurology  [address] [place] [address], [address]  [place] [address]  Phone: [phone]  Fax: [phone]  Follow Up Time: 1-3 Days    Arti Diaz)  Clinical Neurophysiology; EEGEpilepsy; Neurology; Sleep Medicine  [address] [place] [address], [address] [place] [address]  Phone: [phone]  Fax: [phone]  Follow Up Time: 1-3 Days

## 2019-04-08 NOTE — ED STATDOCS - CLINICAL SUMMARY MEDICAL DECISION MAKING FREE TEXT BOX
49 y/o F with right sided facial cramping. Plan: cardiac workup, follow up with PMD to consider increasing Amlodipine, reassess.

## 2019-04-08 NOTE — ED STATDOCS - ATTENDING CONTRIBUTION TO CARE
I, Lane Marshall, performed the initial face to face bedside interview with this patient regarding history of present illness, review of symptoms and relevant past medical, social and family history.  I completed an independent physical examination.  I was the initial provider who evaluated this patient. I have signed out the follow up of any pending tests (i.e. labs, radiological studies) to the ACP.  I have communicated the patient’s plan of care and disposition with the ACP.  The history, relevant review of systems, past medical and surgical history, medical decision making, and physical examination was documented by the scribe in my presence and I attest to the accuracy of the documentation.

## 2019-04-08 NOTE — ED STATDOCS - OBJECTIVE STATEMENT
49 y/o F with PMHx of HTN, on Amlodipine 5mg, presenting to the ED with daughter c/o right-sided facial "cramping," since last night. Pt speaks Berm[place]ian Creole and HPI obtained with assistance of daughter at bedside and [place]  #[id number]. Pt also notes intermittent right-sided weakness x1 year. Pt has been evaluated by PMD for this issue with normal work-up, per patient. CT Brain Stroke on 01/16/2018 was unremarkable. No facial droop, visual changes, slurred speech, HA, CP, SOB, abd pain, N/V/D, fevers, or chills. /96 in ED triage vitals. No recent changes to HTN medications. No PSHx. NKDA. PMD: Dr. Irlanda Drummond.

## 2019-04-26 ENCOUNTER — APPOINTMENT (OUTPATIENT)
Dept: NEUROLOGY | Facility: CLINIC | Age: 49
End: 2019-04-26
Payer: COMMERCIAL

## 2019-04-26 VITALS
BODY MASS INDEX: 21.79 KG/M2 | HEART RATE: 94 BPM | HEIGHT: 63 IN | WEIGHT: 123 LBS | DIASTOLIC BLOOD PRESSURE: 78 MMHG | SYSTOLIC BLOOD PRESSURE: 156 MMHG

## 2019-04-26 DIAGNOSIS — M54.2 CERVICALGIA: ICD-10-CM

## 2019-04-26 PROCEDURE — 99214 OFFICE O/P EST MOD 30 MIN: CPT

## 2019-04-26 RX ORDER — ALUMINUM HYDROXIDE AND MAGNESIUM TRISILICATE 80; 14.2 MG/1; MG/1
TABLET, CHEWABLE ORAL
Refills: 0 | Status: DISCONTINUED | COMMUNITY

## 2019-04-26 RX ORDER — RANITIDINE HYDROCHLORIDE 150 MG/1
150 CAPSULE ORAL
Refills: 0 | Status: DISCONTINUED | COMMUNITY

## 2019-04-26 RX ORDER — PANTOPRAZOLE 40 MG/1
40 TABLET, DELAYED RELEASE ORAL
Qty: 30 | Refills: 6 | Status: DISCONTINUED | COMMUNITY
Start: 2018-01-23 | End: 2019-04-26

## 2019-04-26 RX ORDER — LORATADINE 5 MG
17 TABLET,CHEWABLE ORAL
Refills: 0 | Status: DISCONTINUED | COMMUNITY

## 2019-04-26 RX ORDER — ESCITALOPRAM OXALATE 10 MG/1
10 TABLET ORAL
Qty: 30 | Refills: 0 | Status: DISCONTINUED | COMMUNITY
Start: 2017-07-29 | End: 2019-04-26

## 2019-04-26 NOTE — REVIEW OF SYSTEMS
[Depression] : depression [As Noted in HPI] : as noted in HPI [Eyesight Problems] : eyesight problems [Negative] : Genitourinary [FreeTextEntry7] : loss of appetite [de-identified] : headache, dizziness, imbalance [FreeTextEntry9] : cramps

## 2019-04-26 NOTE — HISTORY OF PRESENT ILLNESS
[FreeTextEntry1] : \par She reports having weakness in right arm and leg for about one year. She says that symptoms are constant. \par Sometimes she has numbness on the right face and body.\par Sometimes she has headaches and neck pain.\par The neck pain radiates into her shoulders bilaterally but does not seem to radiate down the arms.\par She is unable to report any exacerbating or alleviating factors. \par \par \par \par She went to the hospital on 4/8/19. Her son says that it was for the same reason as she describes above.\par From ED note on 4/8/19:\par 47 y/o F with PMHx of HTN, on Amlodipine 5 mg, presenting to the ED with daughter c/o right-sided facial "cramping," since last night. Pt speaks Nauruan Creole and HPI obtained with assistance of daughter at bedside and [place]  #[id number]. Pt also notes intermittent right-sided weakness x 1 year. Pt has been evaluated by PMD for this issue with normal work-up, per patient.

## 2019-04-26 NOTE — PHYSICAL EXAM
[FreeTextEntry1] : Examination:\par Constitutional: normal, no apparent distress\par Eyes: normal conjunctiva b/l, no ptosis, visual fields full\par Respiratory: no respiratory distress, normal effort, normal auscultation\par Cardiovascular: normal rate, rhythm, no murmurs\par Neck: supple, no masses\par Vascular: carotids normal\par Skin: normal color, no rashes\par Psych: normal mood, affect\par \par Neurological:\par Memory: difficult to discern due to language barrier\par Language intact/no aphasia\par Cranial Nerves: II-XII intact, Pupils equally round and reactive to light, ocular muscles/movements intact, no ptosis, no facial weakness, tongue protrudes normally in the midline, \par Motor: normal tone, no pronator drift, full strength in all four extremities in the proximal and distal muscle groups\par Coordination: Fine motor movements intact, rapid alternating movements intact, finger to nose intact bilaterally\par Sensory: intact to light touch, vibration, joint position sense, negative Romberg examination\par DTRs: symmetric, 2+ in b/l triceps, 2+ in b/l biceps, 2+ in b/l brachioradialis, 2+ in bilateral patellars, 2+ in bilateral Achilles, Babinskis negative bilaterally\par Gait: narrow based, steady, able to walk on heels, toes, tandem gait\par \par

## 2019-04-26 NOTE — CONSULT LETTER
[Dear  ___] : Dear  [unfilled], [Consult Letter:] : I had the pleasure of evaluating your patient, [unfilled]. [Please see my note below.] : Please see my note below. [Consult Closing:] : Thank you very much for allowing me to participate in the care of this patient.  If you have any questions, please do not hesitate to contact me. [FreeTextEntry2] : Irlanda Drummond [FreeTextEntry3] : Sincerely,\par \par \par Arti Diaz MD\par Diplomate, American Academy of Psychiatry and Neurology\par Board Certified in the Subspecialty of Clinical Neurophysiology\par Board Certified in the Subspecialty of Sleep Medicine\par Board Certified in the Subspecialty of Epilepsy\par

## 2019-05-29 ENCOUNTER — APPOINTMENT (OUTPATIENT)
Dept: NEUROLOGY | Facility: CLINIC | Age: 49
End: 2019-05-29
Payer: COMMERCIAL

## 2019-06-06 ENCOUNTER — OUTPATIENT (OUTPATIENT)
Dept: OUTPATIENT SERVICES | Facility: HOSPITAL | Age: 49
LOS: 1 days | End: 2019-06-06

## 2019-06-06 ENCOUNTER — APPOINTMENT (OUTPATIENT)
Dept: MAMMOGRAPHY | Facility: CLINIC | Age: 49
End: 2019-06-06

## 2019-06-06 DIAGNOSIS — Z00.8 ENCOUNTER FOR OTHER GENERAL EXAMINATION: ICD-10-CM

## 2019-06-06 PROCEDURE — 77067 SCR MAMMO BI INCL CAD: CPT | Mod: 26

## 2019-06-06 PROCEDURE — 77063 BREAST TOMOSYNTHESIS BI: CPT | Mod: 26

## 2019-06-09 ENCOUNTER — FORM ENCOUNTER (OUTPATIENT)
Age: 49
End: 2019-06-09

## 2019-06-10 ENCOUNTER — OTHER (OUTPATIENT)
Age: 49
End: 2019-06-10

## 2019-06-10 ENCOUNTER — OUTPATIENT (OUTPATIENT)
Dept: OUTPATIENT SERVICES | Facility: HOSPITAL | Age: 49
LOS: 1 days | End: 2019-06-10
Payer: MEDICAID

## 2019-06-10 ENCOUNTER — APPOINTMENT (OUTPATIENT)
Dept: MRI IMAGING | Facility: CLINIC | Age: 49
End: 2019-06-10

## 2019-06-10 DIAGNOSIS — Z00.00 ENCOUNTER FOR GENERAL ADULT MEDICAL EXAMINATION WITHOUT ABNORMAL FINDINGS: ICD-10-CM

## 2019-06-10 PROCEDURE — 72141 MRI NECK SPINE W/O DYE: CPT | Mod: 26

## 2019-06-10 PROCEDURE — 70551 MRI BRAIN STEM W/O DYE: CPT | Mod: 26

## 2019-06-10 PROCEDURE — 72141 MRI NECK SPINE W/O DYE: CPT

## 2019-06-10 PROCEDURE — 70551 MRI BRAIN STEM W/O DYE: CPT

## 2019-06-20 ENCOUNTER — APPOINTMENT (OUTPATIENT)
Dept: NEUROLOGY | Facility: CLINIC | Age: 49
End: 2019-06-20
Payer: COMMERCIAL

## 2019-06-20 VITALS
BODY MASS INDEX: 21.97 KG/M2 | DIASTOLIC BLOOD PRESSURE: 80 MMHG | HEART RATE: 92 BPM | HEIGHT: 63 IN | SYSTOLIC BLOOD PRESSURE: 144 MMHG | WEIGHT: 124 LBS

## 2019-06-20 PROCEDURE — 99214 OFFICE O/P EST MOD 30 MIN: CPT

## 2019-07-18 NOTE — PHYSICAL EXAM
[FreeTextEntry1] : Examination:\par Constitutional: normal, no apparent distress\par Eyes: normal conjunctiva b/l, no ptosis, visual fields full\par Respiratory: no respiratory distress, normal effort, normal auscultation\par Cardiovascular: normal rate, rhythm, no murmurs\par Neck: supple, no masses\par Vascular: carotids normal\par Skin: normal color, no rashes\par Psych: normal mood, affect\par \par Neurological:\par Memory: normal memory, oriented to person, place, time\par Language intact/no aphasia\par Cranial Nerves: II-XII intact, Pupils equally round and reactive to light, ocular muscles/movements intact, no ptosis, no facial weakness, tongue protrudes normally in the midline, \par Motor: normal tone, no pronator drift, full strength in all four extremities in the proximal and distal muscle groups\par Coordination: Fine motor movements intact, rapid alternating movements intact, finger to nose intact bilaterally\par Sensory: intact to light touch\par DTRs: symmetric, + Chase's bialterally\par Gait: narrow based, steady\par

## 2019-07-18 NOTE — DATA REVIEWED
[de-identified] : MRI brain and cervical spine 6/10/19: Minimal punctate foci of signal hyperintensity on T2 and FLAIR in the periventricular and subcortical white matter which are nonspecific in a patient of this age and may represent foci of infection, inflammation, demyelination or ischemia. \par  [de-identified] : CT head 4/8/19: unremarkable

## 2019-07-18 NOTE — HISTORY OF PRESENT ILLNESS
Left arm;
[FreeTextEntry1] : I last saw Ms. Mathis on 4/26/19.\par \par She is here with her son. [employer] was used for translation for Marshallese Creole.\par \par She says that she is feeling well today.\par She reports that  she has right sided weakness that she feels from time to time. The symptoms come and go without any recognizable pattern.\par She says that she does not have pain but when the weakness comes she has to hold her arm. \par She reports that the arm and leg are involved. After a few minutes it resolves.\par The right side is mainly affected but there are times when both knees feel weak.\par She denies having any loss of sensation/numbness/tingling.\par She states that she had an eye problem. She was told that she had glaucoma. She had a laser procedure and no longer has pain but does have some blurry vision. When she went back to the doctor she was told that her eyes were normal but she was prescribed eye drops.\par She also reported that she had a dental infection. At times her gums  feel very hot.\par She continues to have the feeling of heat in her gums and tooth pain.  despite treatment with ? antibiotics for ten days. She is now being referred to a specialist to have the tooth extracted.

## 2019-07-18 NOTE — DISCUSSION/SUMMARY
[FreeTextEntry1] : Ms. Lesly Dick is a 49 year old woman with one year of intermittent right sided weakness and paresthesias.\par Her neurological examination is non-focal at this time.\par CT head has been unremarkable.\par \par MRI cervical spine shows some mild disc bulges at C5-C6 and C6-C7 without central stenosis. \par I explained that the MRI cervical spine does not explain the cause of her symptoms. \par \par We discussed the results of her MRI brain - nonspecific white matter changes.\par I reviewed the MRI as well and do not think that these are specific for demyelinating disease. However, with a symptoms of right sided weakness which is not otherwise explained, this should be investigated.\par Will arrange for lumbar puncture in radiology department to check oligoclonal bands and for lyme disease.\par Will have blood tests prior to LP including PT/PTT and CBC.\par In the past she had evidence of neutropenia. Serum for HTLV was negative at that time.\par Will follow-up after lumbar puncture. If LP is unremarkable can also image thoracic spine. \par \par \par Follow-up in one month, sooner if needed. \par \par Annotation from 7/18/19: The patient has been clinically stable since the visit. \par

## 2019-07-18 NOTE — CONSULT LETTER
[Dear  ___] : Dear  [unfilled], [Please see my note below.] : Please see my note below. [Consult Closing:] : Thank you very much for allowing me to participate in the care of this patient.  If you have any questions, please do not hesitate to contact me. [FreeTextEntry2] : Irlanda Drummond [FreeTextEntry3] : Sincerely,\par \par \par Arti Diaz MD\par Diplomate, American Academy of Psychiatry and Neurology\par Board Certified in the Subspecialty of Clinical Neurophysiology\par Board Certified in the Subspecialty of Sleep Medicine\par Board Certified in the Subspecialty of Epilepsy\par

## 2019-07-25 ENCOUNTER — FORM ENCOUNTER (OUTPATIENT)
Age: 49
End: 2019-07-25

## 2019-07-26 ENCOUNTER — OUTPATIENT (OUTPATIENT)
Dept: OUTPATIENT SERVICES | Facility: HOSPITAL | Age: 49
LOS: 1 days | Discharge: HOME | End: 2019-07-26

## 2019-07-26 VITALS
HEART RATE: 90 BPM | OXYGEN SATURATION: 100 % | RESPIRATION RATE: 14 BRPM | TEMPERATURE: 98 F | DIASTOLIC BLOOD PRESSURE: 76 MMHG | SYSTOLIC BLOOD PRESSURE: 135 MMHG

## 2019-07-26 VITALS
DIASTOLIC BLOOD PRESSURE: 80 MMHG | HEART RATE: 92 BPM | RESPIRATION RATE: 14 BRPM | SYSTOLIC BLOOD PRESSURE: 137 MMHG | OXYGEN SATURATION: 100 %

## 2019-07-26 LAB
APPEARANCE CSF: ABNORMAL
APPEARANCE SPUN FLD: COLORLESS — SIGNIFICANT CHANGE UP
COLOR CSF: ABNORMAL
CSF PCR RESULT: SIGNIFICANT CHANGE UP
GLUCOSE CSF-MCNC: 77 MG/DL — HIGH (ref 40–70)
GRAM STN FLD: SIGNIFICANT CHANGE UP
NEUTROPHILS # CSF: SIGNIFICANT CHANGE UP (ref 0–6)
NRBC NFR CSF: <1 — SIGNIFICANT CHANGE UP (ref 0–5)
PROT CSF-MCNC: 156 MG/DL — HIGH (ref 15–45)
RBC # CSF: HIGH /UL (ref 0–0)
SPECIMEN SOURCE: SIGNIFICANT CHANGE UP
TUBE TYPE: SIGNIFICANT CHANGE UP

## 2019-07-27 LAB — PROT CSF-MCNC: 156 MG/DL — HIGH (ref 15–45)

## 2019-07-29 LAB
ALBUMIN CSF-MCNC: 24.2 MG/DL — SIGNIFICANT CHANGE UP (ref 14–25)
ALBUMIN SERPL ELPH-MCNC: 4383 MG/DL — SIGNIFICANT CHANGE UP (ref 3500–5200)
CULTURE RESULTS: NO GROWTH — SIGNIFICANT CHANGE UP
IGG CSF-MCNC: 101.4 MG/DL — HIGH
IGG FLD-MCNC: 1256 MG/DL — SIGNIFICANT CHANGE UP (ref 610–1660)
IGG SYNTH RATE SER+CSF CALC-MRATE: 486.8 MG/DAY — HIGH
IGG/ALB CLEAR SER+CSF-RTO: 14.6 — HIGH
IGG/ALB CSF: 4.19 RATIO — HIGH
IGG/ALB SER: 0.29 RATIO — SIGNIFICANT CHANGE UP
SPECIMEN SOURCE: SIGNIFICANT CHANGE UP
VDRL CSF-TITR: NEGATIVE — SIGNIFICANT CHANGE UP

## 2019-07-31 DIAGNOSIS — R90.82 WHITE MATTER DISEASE, UNSPECIFIED: ICD-10-CM

## 2019-07-31 LAB
INNER EAR 68KD AB FLD QL: <5 U/L — SIGNIFICANT CHANGE UP
MBP CSF-MCNC: <2 MCG/L — LOW (ref 2–4)
OLIGOCLONAL BANDS CSF ELPH-IMP: SIGNIFICANT CHANGE UP
OLIGOCLONAL BANDS CSF ELPH-IMP: SIGNIFICANT CHANGE UP

## 2019-08-05 LAB — VIRUS SPEC CULT: SIGNIFICANT CHANGE UP

## 2019-08-13 LAB — B BURGDOR AB CSF-ACNC: SIGNIFICANT CHANGE UP

## 2019-08-14 ENCOUNTER — OTHER (OUTPATIENT)
Age: 49
End: 2019-08-14

## 2019-08-15 ENCOUNTER — APPOINTMENT (OUTPATIENT)
Dept: NEUROLOGY | Facility: CLINIC | Age: 49
End: 2019-08-15
Payer: COMMERCIAL

## 2019-08-15 VITALS
WEIGHT: 125 LBS | HEIGHT: 63 IN | HEART RATE: 102 BPM | BODY MASS INDEX: 22.15 KG/M2 | DIASTOLIC BLOOD PRESSURE: 86 MMHG | SYSTOLIC BLOOD PRESSURE: 134 MMHG

## 2019-08-15 PROCEDURE — 99213 OFFICE O/P EST LOW 20 MIN: CPT

## 2019-08-15 NOTE — PHYSICAL EXAM
[FreeTextEntry1] : \par Examination:\par Constitutional: normal, no apparent distress\par Eyes: normal conjunctiva b/l, no ptosis, visual fields full\par Respiratory: no respiratory distress, normal effort, normal auscultation\par Cardiovascular: normal rate, rhythm, no murmurs\par Neck: supple, no masses\par Vascular: carotids normal\par Skin: normal color, no rashes\par Psych: normal mood, affect\par \par Neurological:\par Memory: normal memory, oriented to person, place, time\par Language intact/no aphasia\par Cranial Nerves: II-XII intact, Pupils equally round and reactive to light, ocular muscles/movements intact, no ptosis, no facial weakness, tongue protrudes normally in the midline, \par Motor: normal tone, no pronator drift, full strength in all four extremities in the proximal and distal muscle groups\par Coordination: Fine motor movements intact, rapid alternating movements intact, finger to nose intact bilaterally\par Sensory: intact to light touch\par DTRs: symmetric, + Chase's bilaterally\par Gait: narrow based, steady

## 2019-08-15 NOTE — DISCUSSION/SUMMARY
[FreeTextEntry1] : Ms. Lesly Dick is a 49 year old woman with one year of intermittent right sided weakness and paresthesias.\par Her neurological examination is non-focal at this time.\par CT head has been unremarkable.\par \par MRI cervical spine shows some mild disc bulges at C5-C6 and C6-C7 without central stenosis. \par I explained that the MRI cervical spine does not explain the cause of her symptoms. \par \par MRI brain showed nonspecific white matter changes.\par She was sent for LP in interventional radiology.  I discussed the procedure with the radiologist who reported that the tap was non-traumatic, but the fluid was very bloody.\par The results showed markedly elevated RBC, protein, IgG, IgG synthesis and IgG/Albumin ratio. Oligoclonal bands are negative. However, it is unclear if the specimen was actual spinal fluid, or more blood.\par \par We discussed different strategies to possibly figure out the cause of her symptoms which may include:\par -Imaging of the thoracic and lumbar spine\par -Repeat MRI brain and cervical spine with and without contrast in six months\par -Referral to MS specialist for second opinion.\par \par \par Will start with imaging of thoracic and lumbar spine + additional imaging studies.\par Repeat brain and c-spine and if still inconclusive will refer to Dr. Phoenix.\par

## 2019-08-15 NOTE — DATA REVIEWED
[de-identified] : MRI brain and cervical spine 6/10/19: Minimal punctate foci of signal hyperintensity on T2 and FLAIR in the periventricular and subcortical white matter which are nonspecific in a patient of this age and may represent foci of infection, inflammation, demyelination or ischemia. \par  [de-identified] : CT head 4/8/19: unremarkable\par \par CSF: RBC 81,300. WBC less than 1. Protein 156, igG 101.4, Ig/g/albumin ration 4.19. IgG synthesis 486.8. IgG index 14.6. Myelin basic protein less than 2. Glucose 77. No oligoclonal bands. Negative lyme, VDRL.

## 2019-08-15 NOTE — HISTORY OF PRESENT ILLNESS
[FreeTextEntry1] : I last saw Ms. Lesly Dick on 6/20/19.\par She had an LP on 6/26.\par She is accompanied by her son.\par [employer] was used for translation in creole.\par \par She says that she is feeling the same.\par She denies having any new symptoms since the last visit.\par She continues have weakness on the right side.\par She reports having weakness in the left leg as well.\par She feels as if her tongue is hot.\par She has blurry vision in both eyes and reports that they are painful.\par \par Her back is feeling better since the lumbar puncture. \par \par

## 2019-08-19 ENCOUNTER — OTHER (OUTPATIENT)
Age: 49
End: 2019-08-19

## 2019-08-21 NOTE — ED PROVIDER NOTE - CPE EDP CARDIAC NORM
Physical Therapy Evaluation    Visit Count: 1   Plan of Care: 8/21/2019 Through: 11/14/2019  Insurance Information:   Authorized     Auth #: [id number]     Date Span: 08/22/19 - 11/19/19     Number of Visits: 12 VISITS     Hyper link to: insurance Vascular Closure         Therapy Benefits     Payor: ZARI  Authorization Needed: BEFORE MARYURI - [phone]  Maximum Visit Limit Per Year: BASED ON MEDICAL NECESSITY  CoPay: $0  Notes:   Call Ref #: [id number]     Hyper link to: insurance Vascular Closure     No quote of benefits is guaranteed        Referred by: Ronaldo Almodovar DPM   Next provider visit (if known/scheduled):none  Medical Diagnosis (from order):   Achilles tendonitis, bilateral [M76.61, M76.62]  Contracture of both Achilles tendons [M67.01, M67.02]  Pain of both heels [M79.671, M79.672]  Treatment Diagnosis: bilateral ankle symptoms with increased pain/symptoms, impaired strength, impaired range of motion, impaired muscle length/flexibility, impaired joint mobility/play, impaired gait, impaired activity tolerance, impaired balance    Date of onset/injury: 2 months ago (June 2019)   Diagnosis Precautions: none   Chart reviewed at time of initial evaluation (relevant co-morbidities, allergies, tests and medications listed): morbid obesity     SUBJECTIVE   Description of Problem and/or Mechanism of Injury:   Patient reports she has been having heel pain for about 2 months. Insidious onset, no change in activity levels.  Getting up in the morning, the pain is the worst. Saw the podiatrist and was referred to PT for achilles tendon bilateral. Left is significantly worse.  Has custom orthotics, got some 3 years ago and just got new ones 2 weeks ago.     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 Mostly sitting for the [employer] job, mostly standing for the ER job. Typically walks 60 minutes (has been doing that for over a year)  but has not been able to due to the pain.  Reported in degrees, active range of motion recorded unless noted as AA=active assistive or P=passive; standard testing positions unless otherwise noted, norms included in ( ); *=pain   Only those motions that were assessed are noted.    Strength (out of 5)   Left Right   Date Initial Initial   Hip Flexors     Hip Extensors     Hip Abductors     Hip Adductors     Hip Internal Rotators     Hip External Rotators     Knee Flexors     Knee Extensors     Ankle Dorsiflexors 4    Ankle Plantar Flexors 20 single heel raises  20 single heel raises    Ankle Invertors 4    Ankle Evertors     standard testing positions unless otherwise noted; *=pain  Gross muscle strength within functional/normal limits except as noted.  Overuse of ankle evertors with heel raises     Palpation:  Pain with palpation to distal achilles tendon insertion. Tightness in bilateral gastrocnemius     Joint Play Assessment:  Hypomobile posterior glide at talcocrural joint bilaterally, left> right   Hypomobile subtalar medial glide (eversion)       Outcome Measures:   Lower Extremity Functional Scale: 64 (0=extreme difficulty; 80=no difficulty)     Initial Treatment   Initial evaluation completed.    Therapeutic Exercise:   Patient was educated on results from initial evaluation and what future physical therapy visits will entail. Patient was educated on kinesiology related to the ankle joints and contributing factors to her pain. Patient was educated on and performed HEP. Patient educated on the importance of attending therapy sessions routinely and understands that she may have impaired mobility and possibly increased pain if she does not attend therapy or do their HEP as prescribed.            Skilled input: as detailed above    Home Program:  * above=instructed home program    Long sitting calf stretch x 3 before getting out of bed, 2 more sets throughout the day. 30 seconds each time     Writer verbally educated the patient and received verbal  21076/71778.02)  Gait Training (34118)  Manual Therapy (33214)  Neuromuscular Re-Education (58198)  Therapeutic Activity  (93183)  Therapeutic Exercise (16619)  Electrical Stimulation (47326//04195)   Iontophoresis (12717)  dexamethasone sodium phosphate, 4mg/ml up to 6 applications  Mechanical Traction (44032)  Ultrasound/Phonophoresis (06610)    Frequency/Duration: 2 times per week for 8 weeks with tapering as the patient progresses for an estimated total of 16 visits    patient involved in and agreed to plan of care and goals.   Attendance policy provided at time of evaluation.       Patient Education:   Who will be receiving education: patient  Are they ready to learn: yes  Preferred learning style: written, verbal, demonstration  Barriers to learning: no barriers apparent at this time   Result of initial outlined education: Needs reinforcement    THERAPY DAILY BILLING   Insurance: 1. [employer] 2.     Evaluation Procedures:  Physical Therapy Evaluation: Low Complexity    Timed Procedures:  Therapeutic Exercise, 8 minutes    Untimed Procedures:  No untimed codes were used on this date of service    Total Treatment Time: 40 minutes     normal...

## 2019-08-28 ENCOUNTER — FORM ENCOUNTER (OUTPATIENT)
Age: 49
End: 2019-08-28

## 2019-08-29 ENCOUNTER — OUTPATIENT (OUTPATIENT)
Dept: OUTPATIENT SERVICES | Facility: HOSPITAL | Age: 49
LOS: 1 days | End: 2019-08-29
Payer: MEDICAID

## 2019-08-29 ENCOUNTER — APPOINTMENT (OUTPATIENT)
Dept: MRI IMAGING | Facility: CLINIC | Age: 49
End: 2019-08-29
Payer: COMMERCIAL

## 2019-08-29 DIAGNOSIS — Z00.8 ENCOUNTER FOR OTHER GENERAL EXAMINATION: ICD-10-CM

## 2019-08-29 LAB
25(OH)D3 SERPL-MCNC: 24.9 NG/ML
ALBUMIN MFR SERPL ELPH: 57.7 %
ALBUMIN SERPL-MCNC: 4.4 G/DL
ALBUMIN/GLOB SERPL: 1.3 RATIO
ALPHA1 GLOB MFR SERPL ELPH: 3.8 %
ALPHA1 GLOB SERPL ELPH-MCNC: 0.3 G/DL
ALPHA2 GLOB MFR SERPL ELPH: 10.1 %
ALPHA2 GLOB SERPL ELPH-MCNC: 0.8 G/DL
B-GLOBULIN MFR SERPL ELPH: 12.4 %
B-GLOBULIN SERPL ELPH-MCNC: 1 G/DL
CRP SERPL-MCNC: 0.48 MG/DL
DSDNA AB SER-ACNC: <12 IU/ML
ERYTHROCYTE [SEDIMENTATION RATE] IN BLOOD BY WESTERGREN METHOD: 32 MM/HR
GAMMA GLOB FLD ELPH-MCNC: 1.2 G/DL
GAMMA GLOB MFR SERPL ELPH: 16 %
INTERPRETATION SERPL IEP-IMP: NORMAL
PROT SERPL-MCNC: 7.7 G/DL
PROT SERPL-MCNC: 7.7 G/DL
RHEUMATOID FACT SER QL: <10 IU/ML
VIT B12 SERPL-MCNC: 642 PG/ML

## 2019-08-29 PROCEDURE — 72148 MRI LUMBAR SPINE W/O DYE: CPT

## 2019-08-29 PROCEDURE — 72157 MRI CHEST SPINE W/O & W/DYE: CPT

## 2019-08-29 PROCEDURE — 72157 MRI CHEST SPINE W/O & W/DYE: CPT | Mod: 26

## 2019-08-29 PROCEDURE — 72148 MRI LUMBAR SPINE W/O DYE: CPT | Mod: 26

## 2019-08-29 PROCEDURE — A9585: CPT

## 2019-09-06 ENCOUNTER — APPOINTMENT (OUTPATIENT)
Dept: NEUROLOGY | Facility: CLINIC | Age: 49
End: 2019-09-06
Payer: COMMERCIAL

## 2019-09-06 VITALS
WEIGHT: 125 LBS | BODY MASS INDEX: 22.15 KG/M2 | HEIGHT: 63 IN | DIASTOLIC BLOOD PRESSURE: 80 MMHG | HEART RATE: 118 BPM | SYSTOLIC BLOOD PRESSURE: 132 MMHG

## 2019-09-06 LAB
ANA SER IF-ACNC: NEGATIVE
AQP4 H2O CHANNEL AB SERPL IA-ACNC: NEGATIVE
ENA SS-A AB SER IA-ACNC: <0.2 AL
ENA SS-B AB SER IA-ACNC: <0.2 AL
VIT B1 SERPL-MCNC: 82.2 NMOL/L
VIT B2 SERPL-MCNC: 182 UG/L
VIT B6 SERPL-MCNC: 11.2 UG/L

## 2019-09-06 PROCEDURE — 99214 OFFICE O/P EST MOD 30 MIN: CPT

## 2019-09-06 NOTE — DISCUSSION/SUMMARY
[FreeTextEntry1] : Ms. Lesly Dick is a 49 year old woman with one year of intermittent right sided weakness and paresthesias.\par Her neurological examination is non-focal at this time.\par CT head has been unremarkable.\par \par MRI cervical spine shows some mild disc bulges at C5-C6 and C6-C7 without central stenosis. \par I explained that the MRI cervical spine does not explain the cause of her symptoms. \par \par MRI brain showed nonspecific white matter changes.\par She was sent for LP in interventional radiology. I discussed the procedure with the radiologist who reported that the tap was non-traumatic, but the fluid was very bloody.\par The results showed markedly elevated RBC, protein, IgG, IgG synthesis and IgG/Albumin ratio. Oligoclonal bands are negative. However, it is unclear if the specimen was actual spinal fluid, or more blood.\par \par Thoracic and lumbar MRI are unremarkable.\par Blood tests looking for NMO and labs for autoimmune disorders were unremarkable other than mild elevation in ESR and CRP.\par \par -Proceed with endoscopy. ? Celiac disease as cause for GI and neurological symptoms.\par \par -If GI workup is unremarkable will consider NCS/EMG and repeat MRI brain in six months.\par \par -Trial of sertraline 25 mg x 1 week and then 50 mg daily for anxiety and depression. She was warned of potential worsening of mood when first starting. She was advised to call if this occurs.\par \par f/u 6 weeks, sooner if needed.

## 2019-09-06 NOTE — HISTORY OF PRESENT ILLNESS
[FreeTextEntry1] : I last saw Ms. Mathis on 8/15/19.\par She is accompanied by her son. [employer] was used to help with translation.\par Since the last visit she reports that she continues to have weakness. The weakness comes and goes. The location of her weakness varies (sometimes upper extremities and sometimes lower extremities).\par She denies having any numbness/tingling. \par She does report occasional cramping sensation in her extremities.\par She wears glasses but has no new vision problems.\par She does report having decreased appetite and says that she is "getting smaller". Her tongue feels dry. She notices acid reflux.\par She saw her primary care doctor. \par She is scheduled for an endoscopy.\par She does report having anxiety and says that her symptoms are worse when she is thinking too much. She reports feeling depressed as well.

## 2019-09-06 NOTE — DATA REVIEWED
[de-identified] : MRI brain and cervical spine 6/10/19: Minimal punctate foci of signal hyperintensity on T2 and FLAIR in the periventricular and subcortical white matter which are nonspecific in a patient of this age and may represent foci of infection, inflammation, demyelination or ischemia. \par  [de-identified] : CT head 4/8/19: unremarkable\par \par CSF: RBC 81,300. WBC less than 1. Protein 156, igG 101.4, Ig/g/albumin ration 4.19. IgG synthesis 486.8. IgG index 14.6. Myelin basic protein less than 2. Glucose 77. No oligoclonal bands. Negative lyme, VDRL.\par \par blood tests 8/28/19: NMO Ab negative, cryoglobulin negative, vitamin B12 642, vitamin D 24.9, vitamin B6 11.2, vitamin B1 82.2, ESR 32, \par RF negative, C-RP 0.48, SPEP negative, anti dsDNA negative, INEZ negative, Sjogren's antibodies negative\par \par MRI thoracic with and without contrast and lumbar spine 8/29/19: No intrinsic cord signal abnormality in the  thoracic spine. No abnormal intrathecal enhancement.\par Normal appearance of the conus medullaris and cauda equina nerve roots.

## 2019-09-06 NOTE — PHYSICAL EXAM
[FreeTextEntry1] : Examination:\par Constitutional: normal, no apparent distress\par Eyes: normal conjunctiva b/l, no ptosis, visual fields full\par Respiratory: no respiratory distress, normal effort, normal auscultation\par Cardiovascular: normal rate, rhythm, no murmurs\par Neck: supple, no masses\par Vascular: carotids normal\par Skin: normal color, no rashes\par Psych: normal mood, affect\par \par Neurological:\par Memory: normal memory, oriented to person, place, time\par Language intact/no aphasia\par Cranial Nerves: II-XII intact, Pupils equally round and reactive to light, ocular muscles/movements intact, no ptosis, no facial weakness, tongue protrudes normally in the midline, \par Motor: normal tone, no pronator drift, full strength in all four extremities in the proximal and distal muscle groups\par Coordination: Fine motor movements intact, rapid alternating movements intact, finger to nose intact bilaterally\par Sensory: intact to light touch\par DTRs: 2/4 in b/l biceps, radialis, patellars, ankles, plantars flexor bilaterally, + Florencia's on right, 2 beats of ankle clonus on left\par Gait: narrow based, steady

## 2019-10-25 ENCOUNTER — APPOINTMENT (OUTPATIENT)
Dept: NEUROLOGY | Facility: CLINIC | Age: 49
End: 2019-10-25
Payer: MEDICAID

## 2019-10-25 VITALS
HEART RATE: 93 BPM | DIASTOLIC BLOOD PRESSURE: 89 MMHG | BODY MASS INDEX: 22.15 KG/M2 | SYSTOLIC BLOOD PRESSURE: 145 MMHG | WEIGHT: 125 LBS | HEIGHT: 63 IN

## 2019-10-25 DIAGNOSIS — R29.898 OTHER SYMPTOMS AND SIGNS INVOLVING THE MUSCULOSKELETAL SYSTEM: ICD-10-CM

## 2019-10-25 PROCEDURE — 99213 OFFICE O/P EST LOW 20 MIN: CPT

## 2019-10-25 NOTE — HISTORY OF PRESENT ILLNESS
[FreeTextEntry1] : I last saw Ms. Mathis on 9/6/19.\par She is here today with her daughter who is translating.\par She had an endoscopy since the last visit and was found to have reflux. She was prescribed omeprazole.\par She gets full quickly.\par \par She continues to have intermittent weakness in her right arm and in her right leg and sometimes the left leg.\par \par She took Sertraline but she ran out.\par She is not sure if it helped with depression and anxiety but is wiling to restart it.

## 2019-10-25 NOTE — DISCUSSION/SUMMARY
[FreeTextEntry1] : Ms. Lesly Dick is a 49 year old woman with one year of intermittent right sided weakness and paresthesias which date back to at least 2018.\par Her neurological examination is non-focal at this time.\par CT head has been unremarkable.\par \par MRI cervical spine shows some mild disc bulges at C5-C6 and C6-C7 without central stenosis. \par I explained that the MRI cervical spine does not explain the cause of her symptoms. \par \par MRI brain showed nonspecific white matter changes.\par She was sent for LP in interventional radiology. I discussed the procedure with the radiologist who reported that the tap was non-traumatic, but the fluid was very bloody.\par The results showed markedly elevated RBC, protein, IgG, IgG synthesis and IgG/Albumin ratio. Oligoclonal bands are negative. However, it is unclear if the specimen was actual spinal fluid, or more blood.\par \par Thoracic and lumbar MRI are unremarkable.\par Blood tests looking for NMO and labs for autoimmune disorders were unremarkable other than mild elevation in ESR and CRP.\par \par \par -Restart sertraline to help with depression/anxiety.\par \par -Will get NCS/EMG to help explain right sided weakness.\par If NCS/EMG is unrevealing will repeat MRI brain.\par \par \par f/u after EMG.\par \par

## 2019-10-25 NOTE — PHYSICAL EXAM
[FreeTextEntry1] : Examination:\par Constitutional: normal, no apparent distress\par Eyes: normal conjunctiva b/l, no ptosis, visual fields full\par Respiratory: no respiratory distress, normal effort, normal auscultation\par Cardiovascular: normal rate, rhythm, no murmurs\par Neck: supple, no masses\par Vascular: carotids normal\par Skin: normal color, no rashes\par Psych: normal mood, affect\par \par Neurological:\par Memory: normal memory, oriented to person, place, time\par Language intact/no aphasia\par Cranial Nerves: II-XII intact, Pupils equally round and reactive to light, ocular muscles/movements intact, no ptosis, no facial weakness, tongue protrudes normally in the midline, \par Motor: normal tone, no pronator drift, full strength in all four extremities in the proximal and distal muscle groups\par Coordination: Fine motor movements intact, rapid alternating movements intact, finger to nose intact bilaterally\par Sensory: intact to light touch\par DTRs: 2/4 in b/l biceps, radialis, patellars, ankles, plantars flexor bilaterally, + Florencia's on right, 2 beats of ankle clonus on left\par Gait: narrow based, steady.

## 2019-10-25 NOTE — DATA REVIEWED
[de-identified] : MRI brain and cervical spine 6/10/19: Minimal punctate foci of signal hyperintensity on T2 and FLAIR in the periventricular and subcortical white matter which are nonspecific in a patient of this age and may represent foci of infection, inflammation, demyelination or ischemia. \par  [de-identified] : CT head 4/8/19: unremarkable\par \par CSF: RBC 81,300. WBC less than 1. Protein 156, igG 101.4, Ig/g/albumin ration 4.19. IgG synthesis 486.8. IgG index 14.6. Myelin basic protein less than 2. Glucose 77. No oligoclonal bands. Negative lyme, VDRL.\par \par blood tests 8/28/19: NMO Ab negative, cryoglobulin negative, vitamin B12 642, vitamin D 24.9, vitamin B6 11.2, vitamin B1 82.2, ESR 32, \par RF negative, C-RP 0.48, SPEP negative, anti dsDNA negative, INEZ negative, Sjogren's antibodies negative\par \par MRI thoracic with and without contrast and lumbar spine 8/29/19: No intrinsic cord signal abnormality in the  thoracic spine. No abnormal intrathecal enhancement.\par Normal appearance of the conus medullaris and cauda equina nerve roots.

## 2019-11-21 ENCOUNTER — APPOINTMENT (OUTPATIENT)
Dept: NEUROLOGY | Facility: CLINIC | Age: 49
End: 2019-11-21
Payer: MEDICAID

## 2019-11-21 PROCEDURE — 95910 NRV CNDJ TEST 7-8 STUDIES: CPT

## 2019-11-21 PROCEDURE — 95886 MUSC TEST DONE W/N TEST COMP: CPT

## 2019-11-25 ENCOUNTER — APPOINTMENT (OUTPATIENT)
Dept: NEUROLOGY | Facility: CLINIC | Age: 49
End: 2019-11-25
Payer: MEDICAID

## 2019-11-25 VITALS
BODY MASS INDEX: 23.04 KG/M2 | WEIGHT: 130 LBS | SYSTOLIC BLOOD PRESSURE: 145 MMHG | DIASTOLIC BLOOD PRESSURE: 94 MMHG | HEIGHT: 63 IN | HEART RATE: 90 BPM

## 2019-11-25 PROCEDURE — 99213 OFFICE O/P EST LOW 20 MIN: CPT

## 2019-11-25 RX ORDER — AMLODIPINE BESYLATE 5 MG/1
5 TABLET ORAL
Refills: 0 | Status: DISCONTINUED | COMMUNITY
Start: 2017-11-28 | End: 2019-11-25

## 2019-11-25 NOTE — DATA REVIEWED
[de-identified] : MRI brain and cervical spine 6/10/19: Minimal punctate foci of signal hyperintensity on T2 and FLAIR in the periventricular and subcortical white matter which are nonspecific in a patient of this age and may represent foci of infection, inflammation, demyelination or ischemia. \par  [de-identified] : CT head 4/8/19: unremarkable\par \par CSF: RBC 81,300. WBC less than 1. Protein 156, igG 101.4, Ig/g/albumin ration 4.19. IgG synthesis 486.8. IgG index 14.6. Myelin basic protein less than 2. Glucose 77. No oligoclonal bands. Negative lyme, VDRL.\par \par blood tests 8/28/19: NMO Ab negative, cryoglobulin negative, vitamin B12 642, vitamin D 24.9, vitamin B6 11.2, vitamin B1 82.2, ESR 32, \par RF negative, C-RP 0.48, SPEP negative, anti dsDNA negative, INEZ negative, Sjogren's antibodies negative\par \par MRI thoracic with and without contrast and lumbar spine 8/29/19: No intrinsic cord signal abnormality in the  thoracic spine. No abnormal intrathecal enhancement.\par Normal appearance of the conus medullaris and cauda equina nerve roots. \par \par \par EMG right upper and lower extremities 11/21/19: \par right median nerve entrapment at the wrist\par No evidence of underlying peripheral neuropathy, cervical or lumbar radiculopathy on the right\par

## 2019-11-25 NOTE — CONSULT LETTER
[Consult Letter:] : I had the pleasure of evaluating your patient, [unfilled]. [Dear  ___] : Dear  [unfilled], [FreeTextEntry2] : Irlanda Drummond [Please see my note below.] : Please see my note below. [Consult Closing:] : Thank you very much for allowing me to participate in the care of this patient.  If you have any questions, please do not hesitate to contact me. [FreeTextEntry3] : Sincerely,\par \par \par Arti Daiz MD\par Diplomate, American Academy of Psychiatry and Neurology\par Board Certified in the Subspecialty of Clinical Neurophysiology\par Board Certified in the Subspecialty of Sleep Medicine\par Board Certified in the Subspecialty of Epilepsy\par

## 2019-11-25 NOTE — HISTORY OF PRESENT ILLNESS
[FreeTextEntry1] : I last saw Ms. Mathis on 10/25/19.\par She is accompanied by her daughter today who is translating.\par \par She reports feeling somewhat better since changing her blood pressure medication from amlodipine to losartan.\par \par She continues to report symptoms on the right side of her body.\par She reports weakness in her right arm. \par She states that her legs feel better overall. \par \par She is taking sertraline and does think that it is helpful.

## 2019-11-25 NOTE — DISCUSSION/SUMMARY
[FreeTextEntry1] : Ms. Lesly Dick is a 49 year old woman with one year of intermittent right sided weakness and paresthesias which date back to at least 2018.\par Her neurological examination is non-focal at this time.\par CT head has been unremarkable.\par \par MRI cervical spine shows some mild disc bulges at C5-C6 and C6-C7 without central stenosis. \par I explained that the MRI cervical spine does not explain the cause of her symptoms. \par \par MRI brain showed nonspecific white matter changes.\par She was sent for LP in interventional radiology. I discussed the procedure with the radiologist who reported that the tap was non-traumatic, but the fluid was very bloody.\par The results showed markedly elevated RBC, protein, IgG, IgG synthesis and IgG/Albumin ratio. Oligoclonal bands are negative. 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 There is no evidence of cervical or lumbar radiculopathy. \par We discussed the findings and treatment options.\par I advised her to start with use of a wrist splint at bedtime to prevent further compression of the median nerve. If symptoms do not improve or if lower extremity symptoms recur, we can repeat MRI brain to ensure stability of white matter changes.\par \par Continue sertraline.\par \par f/u in 3 months, sooner if needed.

## 2019-12-11 NOTE — ED PROVIDER NOTE - MUSCULOSKELETAL, MLM
[de-identified] : Complaining of a fever, cough and runny nose since yesterday. Doing a little better today. [FreeTextEntry6] : sudden onset of fever yesterday- mild cough and congestion- fever down to low grade today Spine appears normal, range of motion is not limited, no muscle or joint tenderness

## 2020-02-25 ENCOUNTER — APPOINTMENT (OUTPATIENT)
Dept: NEUROLOGY | Facility: CLINIC | Age: 50
End: 2020-02-25

## 2020-06-04 ENCOUNTER — APPOINTMENT (OUTPATIENT)
Dept: NEUROLOGY | Facility: CLINIC | Age: 50
End: 2020-06-04
Payer: COMMERCIAL

## 2020-06-04 VITALS — HEIGHT: 63 IN | WEIGHT: 125 LBS | BODY MASS INDEX: 22.15 KG/M2

## 2020-06-04 DIAGNOSIS — Z00.00 ENCOUNTER FOR GENERAL ADULT MEDICAL EXAMINATION W/OUT ABNORMAL FINDINGS: ICD-10-CM

## 2020-06-04 DIAGNOSIS — R53.1 WEAKNESS: ICD-10-CM

## 2020-06-04 DIAGNOSIS — M79.606 PAIN IN LEG, UNSPECIFIED: ICD-10-CM

## 2020-06-04 DIAGNOSIS — R90.82 WHITE MATTER DISEASE, UNSPECIFIED: ICD-10-CM

## 2020-06-04 PROCEDURE — 99214 OFFICE O/P EST MOD 30 MIN: CPT | Mod: 95

## 2020-06-04 NOTE — DATA REVIEWED
[de-identified] : MRI brain and cervical spine 6/10/19: Minimal punctate foci of signal hyperintensity on T2 and FLAIR in the periventricular and subcortical white matter which are nonspecific in a patient of this age and may represent foci of infection, inflammation, demyelination or ischemia. \par  [de-identified] : CT head 4/8/19: unremarkable\par \par CSF: RBC 81,300. WBC less than 1. Protein 156, igG 101.4, Ig/g/albumin ration 4.19. IgG synthesis 486.8. IgG index 14.6. Myelin basic protein less than 2. Glucose 77. No oligoclonal bands. Negative lyme, VDRL.\par \par blood tests 8/28/19: NMO Ab negative, cryoglobulin negative, vitamin B12 642, vitamin D 24.9, vitamin B6 11.2, vitamin B1 82.2, ESR 32, \par RF negative, C-RP 0.48, SPEP negative, anti dsDNA negative, INEZ negative, Sjogren's antibodies negative\par \par MRI thoracic with and without contrast and lumbar spine 8/29/19: No intrinsic cord signal abnormality in the  thoracic spine. No abnormal intrathecal enhancement.\par Normal appearance of the conus medullaris and cauda equina nerve roots. \par \par \par EMG right upper and lower extremities 11/21/19: \par right median nerve entrapment at the wrist\par No evidence of underlying peripheral neuropathy, cervical or lumbar radiculopathy on the right\par

## 2020-06-04 NOTE — DISCUSSION/SUMMARY
[FreeTextEntry1] : Ms. Lesly Dick is a 49 year old woman with one year of intermittent right sided weakness and paresthesias which date back to at least 2018.\par Her neurological examination is non-focal at this time.\par CT head has been unremarkable.\par \par MRI cervical spine shows some mild disc bulges at C5-C6 and C6-C7 without central stenosis. \par I explained that the MRI cervical spine does not explain the cause of her symptoms. \par \par MRI brain showed nonspecific white matter changes.\par She was sent for LP in interventional radiology. I discussed the procedure with the radiologist who reported that the tap was non-traumatic, but the fluid was very bloody.\par The results showed markedly elevated RBC, protein, IgG, IgG synthesis and IgG/Albumin ratio. Oligoclonal bands are negative. 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 There is no evidence of cervical or lumbar radiculopathy. \par We discussed the findings and treatment options.\par \par -Repeat MRI brain to r/o any worsening of white matter disease to suggest demyelinating disease/MS.\par -Continue use of wrist splint for carpal tunnel syndrome\par -rheumatology referral since there is no definite answer for many of her symptoms. ? fibromyalgia.\par -Trial of gabapentin 300 mg qhs. If well tolerated and beneficial, can increase dose to 300 mg BID.\par \par \par \par f/u in 3 months, sooner if needed. \par

## 2020-06-04 NOTE — CONSULT LETTER
[Dear  ___] : Dear  [unfilled], [Please see my note below.] : Please see my note below. [Consult Letter:] : I had the pleasure of evaluating your patient, [unfilled]. [FreeTextEntry2] : Irlanda Drummond [Consult Closing:] : Thank you very much for allowing me to participate in the care of this patient.  If you have any questions, please do not hesitate to contact me. [FreeTextEntry3] : Sincerely,\par \par \par Arti Diaz MD\par Diplomate, American Academy of Psychiatry and Neurology\par Board Certified in the Subspecialty of Clinical Neurophysiology\par Board Certified in the Subspecialty of Sleep Medicine\par Board Certified in the Subspecialty of Epilepsy\par

## 2020-06-04 NOTE — HISTORY OF PRESENT ILLNESS
[Medical Office: ([facility])___] : at the medical office located in  [Home] : at home, [unfilled] , at the time of the visit. [Verbal consent obtained from patient] : the patient, [unfilled] [Other:____] : [unfilled] [FreeTextEntry4] : Patient's daughter [FreeTextEntry1] : \par This is a telehealth visit that was performed with the originating site at the patient’s home and the distant site of my office at [address] [place] [address].\par Two way audio and visual technology was used. \par Verbal consent to participate in the telephone/video visit was obtained in lieu of in-person signature due to the coronavirus emergency.\par This particular visit occurred during the 2020 COVID-19 emergency. I discussed with the patient the nature of our telehealth visits, that:\par -I would evaluate the patient and recommend diagnostics and treatments based on my assessment.\par -Our sessions are not being recorded.\par -The patient acknowledged the risk of unsecure transmission of his/her information.\par -Our team would provide follow up care in person if/when the patient needs it.\par \par She was last seen in the office on 11/25/19.\par \par Her daughter is present and is helping with translation.\par She is reporting pain in her right arm and weakness in both of her legs.\par At times she feels weakness in her right hand and feels as if she will drop an object.\par She is using a wrist splint at bedtime.\par \par She stopped sertraline because she did not like how it made her feel.\par \par

## 2020-06-19 ENCOUNTER — APPOINTMENT (OUTPATIENT)
Dept: MAMMOGRAPHY | Facility: CLINIC | Age: 50
End: 2020-06-19
Payer: COMMERCIAL

## 2020-06-19 ENCOUNTER — RESULT REVIEW (OUTPATIENT)
Age: 50
End: 2020-06-19

## 2020-06-19 ENCOUNTER — OUTPATIENT (OUTPATIENT)
Dept: OUTPATIENT SERVICES | Facility: HOSPITAL | Age: 50
LOS: 1 days | End: 2020-06-19
Payer: MEDICAID

## 2020-06-19 DIAGNOSIS — Z00.8 ENCOUNTER FOR OTHER GENERAL EXAMINATION: ICD-10-CM

## 2020-06-19 PROCEDURE — 77063 BREAST TOMOSYNTHESIS BI: CPT

## 2020-06-19 PROCEDURE — 77067 SCR MAMMO BI INCL CAD: CPT

## 2020-06-19 PROCEDURE — 77067 SCR MAMMO BI INCL CAD: CPT | Mod: 26

## 2020-06-19 PROCEDURE — 77063 BREAST TOMOSYNTHESIS BI: CPT | Mod: 26

## 2020-07-02 ENCOUNTER — APPOINTMENT (OUTPATIENT)
Dept: GASTROENTEROLOGY | Facility: CLINIC | Age: 50
End: 2020-07-02
Payer: COMMERCIAL

## 2020-07-02 VITALS
HEIGHT: 63 IN | WEIGHT: 118 LBS | DIASTOLIC BLOOD PRESSURE: 103 MMHG | BODY MASS INDEX: 20.91 KG/M2 | SYSTOLIC BLOOD PRESSURE: 168 MMHG | HEART RATE: 109 BPM

## 2020-07-02 DIAGNOSIS — R10.84 GENERALIZED ABDOMINAL PAIN: ICD-10-CM

## 2020-07-02 DIAGNOSIS — R12 HEARTBURN: ICD-10-CM

## 2020-07-02 PROCEDURE — 99203 OFFICE O/P NEW LOW 30 MIN: CPT

## 2020-07-02 RX ORDER — SERTRALINE HYDROCHLORIDE 50 MG/1
50 TABLET, FILM COATED ORAL DAILY
Qty: 30 | Refills: 5 | Status: DISCONTINUED | COMMUNITY
Start: 2019-09-06 | End: 2020-07-02

## 2020-07-02 RX ORDER — LOSARTAN POTASSIUM 50 MG/1
50 TABLET, FILM COATED ORAL
Refills: 0 | Status: DISCONTINUED | COMMUNITY
End: 2020-07-02

## 2020-07-05 NOTE — PHYSICAL EXAM
[General Appearance - Alert] : alert [Sclera] : the sclera and conjunctiva were normal [General Appearance - In No Acute Distress] : in no acute distress [PERRL With Normal Accommodation] : pupils were equal in size, round, and reactive to light [Extraocular Movements] : extraocular movements were intact [Jugular Venous Distention Increased] : there was no jugular-venous distention [Neck Cervical Mass (___cm)] : no neck mass was observed [Neck Appearance] : the appearance of the neck was normal [Thyroid Diffuse Enlargement] : the thyroid was not enlarged [Thyroid Nodule] : there were no palpable thyroid nodules [Heart Sounds Gallop] : no gallops [Heart Rate And Rhythm] : heart rate was normal and rhythm regular [Auscultation Breath Sounds / Voice Sounds] : lungs were clear to auscultation bilaterally [Heart Sounds] : normal S1 and S2 [Murmurs] : no murmurs [Heart Sounds Pericardial Friction Rub] : no pericardial rub [Abdomen Tenderness] : non-tender [Abdomen Soft] : soft [Bowel Sounds] : normal bowel sounds [] : no hepato-splenomegaly [Abnormal Walk] : normal gait [Abdomen Mass (___ Cm)] : no abdominal mass palpated [Musculoskeletal - Swelling] : no joint swelling seen [Motor Tone] : muscle strength and tone were normal [Nail Clubbing] : no clubbing  or cyanosis of the fingernails [Oriented To Time, Place, And Person] : oriented to person, place, and time [Impaired Insight] : insight and judgment were intact [Affect] : the affect was normal

## 2020-07-05 NOTE — ASSESSMENT
[FreeTextEntry1] : 51yo female with gerd\par unclear if symptoms all gerd related\par 1. gerd - will check egd\par Risks and benefits of procedure(s) discussed with patient in detail, including but not limited to, perforation, bleeding, reaction to anesthesia, missed lesions.\par will add carafate continue omerpazole\par 2. dysphagia check egd - \par  3. pain - may need ent\par cosndier elavil? for possible hyperalgesia

## 2020-07-05 NOTE — HISTORY OF PRESENT ILLNESS
[de-identified] : 49yo female for evaluation of severe GERD\par She notes severe gerd and burning in her throat\par Can extend to her tongue as well\par She has been seeing another gastroenterologist with last egd over 1 year ago\par She has been on omeprazole with some relief of gerd but not throat burning\par

## 2020-07-16 ENCOUNTER — APPOINTMENT (OUTPATIENT)
Dept: RHEUMATOLOGY | Facility: CLINIC | Age: 50
End: 2020-07-16

## 2020-08-14 ENCOUNTER — EMERGENCY (EMERGENCY)
Facility: HOSPITAL | Age: 50
LOS: 0 days | Discharge: HOME | End: 2020-08-14
Admitting: EMERGENCY MEDICINE
Payer: COMMERCIAL

## 2020-08-14 VITALS
SYSTOLIC BLOOD PRESSURE: 120 MMHG | OXYGEN SATURATION: 99 % | TEMPERATURE: 98 F | HEART RATE: 84 BPM | RESPIRATION RATE: 18 BRPM | DIASTOLIC BLOOD PRESSURE: 61 MMHG

## 2020-08-14 DIAGNOSIS — Z03.818 ENCOUNTER FOR OBSERVATION FOR SUSPECTED EXPOSURE TO OTHER BIOLOGICAL AGENTS RULED OUT: ICD-10-CM

## 2020-08-14 DIAGNOSIS — Z11.59 ENCOUNTER FOR SCREENING FOR OTHER VIRAL DISEASES: ICD-10-CM

## 2020-08-14 PROCEDURE — 99283 EMERGENCY DEPT VISIT LOW MDM: CPT

## 2020-08-14 PROCEDURE — U0003: CPT

## 2020-08-14 NOTE — ED STATDOCS - PATIENT PORTAL LINK FT
You can access the FollowMyHealth Patient Portal offered by [employer] by registering at the following website: [email or web address]. By joining [employer]’s FollowMyHealth portal, you will also be able to view your health information using other applications (apps) compatible with our system.

## 2020-08-14 NOTE — ED STATDOCS - PHYSICAL EXAMINATION
Constitutional: NAD AAOx3. Nontoxic, well appearing. Speaking full sentences  w/o distress  Eyes: EOMI, pupils equal  Head: Normocephalic atraumatic  Mouth: no airway obstruction  Cardiac: [id number]   Resp: Lungs CTAB  GI: Abd s/nt/nd  Neuro: motor and sensory intact

## 2020-08-15 LAB — SARS-COV-2 RNA SPEC QL NAA+PROBE: SIGNIFICANT CHANGE UP

## 2020-08-17 ENCOUNTER — OUTPATIENT (OUTPATIENT)
Dept: OUTPATIENT SERVICES | Facility: HOSPITAL | Age: 50
LOS: 1 days | Discharge: HOME | End: 2020-08-17
Payer: COMMERCIAL

## 2020-08-17 ENCOUNTER — APPOINTMENT (OUTPATIENT)
Dept: GASTROENTEROLOGY | Facility: HOSPITAL | Age: 50
End: 2020-08-17
Payer: COMMERCIAL

## 2020-08-17 ENCOUNTER — RESULT REVIEW (OUTPATIENT)
Age: 50
End: 2020-08-17

## 2020-08-17 VITALS
HEIGHT: 62 IN | TEMPERATURE: 98 F | DIASTOLIC BLOOD PRESSURE: 88 MMHG | OXYGEN SATURATION: 100 % | RESPIRATION RATE: 25 BRPM | SYSTOLIC BLOOD PRESSURE: 154 MMHG | HEART RATE: 87 BPM | WEIGHT: 119.93 LBS

## 2020-08-17 DIAGNOSIS — R12 HEARTBURN: ICD-10-CM

## 2020-08-17 PROCEDURE — 43239 EGD BIOPSY SINGLE/MULTIPLE: CPT

## 2020-08-17 PROCEDURE — 88312 SPECIAL STAINS GROUP 1: CPT

## 2020-08-17 PROCEDURE — 88312 SPECIAL STAINS GROUP 1: CPT | Mod: 26

## 2020-08-17 PROCEDURE — 88305 TISSUE EXAM BY PATHOLOGIST: CPT

## 2020-08-17 PROCEDURE — 88305 TISSUE EXAM BY PATHOLOGIST: CPT | Mod: 26

## 2020-08-17 PROCEDURE — C1726: CPT

## 2020-08-18 ENCOUNTER — RESULT REVIEW (OUTPATIENT)
Age: 50
End: 2020-08-18

## 2020-08-20 DIAGNOSIS — F41.9 ANXIETY DISORDER, UNSPECIFIED: ICD-10-CM

## 2020-08-20 DIAGNOSIS — K21.9 GASTRO-ESOPHAGEAL REFLUX DISEASE WITHOUT ESOPHAGITIS: ICD-10-CM

## 2020-08-20 DIAGNOSIS — R12 HEARTBURN: ICD-10-CM

## 2020-08-20 DIAGNOSIS — I10 ESSENTIAL (PRIMARY) HYPERTENSION: ICD-10-CM

## 2020-08-20 DIAGNOSIS — F32.9 MAJOR DEPRESSIVE DISORDER, SINGLE EPISODE, UNSPECIFIED: ICD-10-CM

## 2020-08-20 DIAGNOSIS — K22.2 ESOPHAGEAL OBSTRUCTION: ICD-10-CM

## 2020-08-20 DIAGNOSIS — K31.89 OTHER DISEASES OF STOMACH AND DUODENUM: ICD-10-CM

## 2020-09-03 ENCOUNTER — APPOINTMENT (OUTPATIENT)
Dept: GASTROENTEROLOGY | Facility: CLINIC | Age: 50
End: 2020-09-03
Payer: COMMERCIAL

## 2020-09-03 VITALS
SYSTOLIC BLOOD PRESSURE: 163 MMHG | WEIGHT: 112 LBS | DIASTOLIC BLOOD PRESSURE: 87 MMHG | BODY MASS INDEX: 19.84 KG/M2 | HEART RATE: 73 BPM

## 2020-09-03 DIAGNOSIS — F32.9 MAJOR DEPRESSIVE DISORDER, SINGLE EPISODE, UNSPECIFIED: ICD-10-CM

## 2020-09-03 DIAGNOSIS — K30 FUNCTIONAL DYSPEPSIA: ICD-10-CM

## 2020-09-03 DIAGNOSIS — K64.9 UNSPECIFIED HEMORRHOIDS: ICD-10-CM

## 2020-09-03 DIAGNOSIS — K90.49 MALABSORPTION DUE TO INTOLERANCE, NOT ELSEWHERE CLASSIFIED: ICD-10-CM

## 2020-09-03 PROCEDURE — 99213 OFFICE O/P EST LOW 20 MIN: CPT

## 2020-09-04 VITALS — TEMPERATURE: 97.7 F

## 2020-09-07 PROBLEM — K64.9 HEMORRHOIDS, UNSPECIFIED HEMORRHOID TYPE: Status: ACTIVE | Noted: 2020-09-04

## 2020-09-07 PROBLEM — K90.49 FOOD INTOLERANCE: Status: ACTIVE | Noted: 2020-09-04

## 2020-09-07 PROBLEM — F32.9 DEPRESSION, UNSPECIFIED DEPRESSION TYPE: Status: ACTIVE | Noted: 2020-09-04

## 2020-09-07 PROBLEM — K30 INDIGESTION: Status: ACTIVE | Noted: 2020-09-04

## 2020-09-07 NOTE — ASSESSMENT
[FreeTextEntry1] : 51yo female with throat burning\par no current gerd on PPI\par never started carafate and will try to resend\par will try elavil d/w pt and son in detail

## 2020-09-07 NOTE — PHYSICAL EXAM
[General Appearance - Alert] : alert [General Appearance - In No Acute Distress] : in no acute distress [Sclera] : the sclera and conjunctiva were normal [PERRL With Normal Accommodation] : pupils were equal in size, round, and reactive to light [Extraocular Movements] : extraocular movements were intact [Auscultation Breath Sounds / Voice Sounds] : lungs were clear to auscultation bilaterally [Heart Rate And Rhythm] : heart rate was normal and rhythm regular [Heart Sounds] : normal S1 and S2 [Heart Sounds Gallop] : no gallops [Murmurs] : no murmurs [Heart Sounds Pericardial Friction Rub] : no pericardial rub [Bowel Sounds] : normal bowel sounds [Abdomen Soft] : soft [Abdomen Tenderness] : non-tender [] : no hepato-splenomegaly [Abdomen Mass (___ Cm)] : no abdominal mass palpated [Abnormal Walk] : normal gait [Nail Clubbing] : no clubbing  or cyanosis of the fingernails [Musculoskeletal - Swelling] : no joint swelling seen [Motor Tone] : muscle strength and tone were normal [Oriented To Time, Place, And Person] : oriented to person, place, and time [Impaired Insight] : insight and judgment were intact [Affect] : the affect was normal

## 2020-09-07 NOTE — HISTORY OF PRESENT ILLNESS
[de-identified] : 49yo female with throat burning\par She is still with some throat and neck burning\par She denies any gerd and notes that dysphagia maybe better\par She approves son translating for her

## 2020-10-15 ENCOUNTER — APPOINTMENT (OUTPATIENT)
Dept: RHEUMATOLOGY | Facility: CLINIC | Age: 50
End: 2020-10-15
Payer: COMMERCIAL

## 2020-10-15 ENCOUNTER — APPOINTMENT (OUTPATIENT)
Dept: GASTROENTEROLOGY | Facility: CLINIC | Age: 50
End: 2020-10-15
Payer: COMMERCIAL

## 2020-10-15 VITALS
HEART RATE: 114 BPM | BODY MASS INDEX: 20.02 KG/M2 | TEMPERATURE: 97.9 F | WEIGHT: 113 LBS | DIASTOLIC BLOOD PRESSURE: 78 MMHG | OXYGEN SATURATION: 98 % | SYSTOLIC BLOOD PRESSURE: 132 MMHG

## 2020-10-15 VITALS
HEIGHT: 63 IN | TEMPERATURE: 98.7 F | BODY MASS INDEX: 20.02 KG/M2 | HEART RATE: 83 BPM | DIASTOLIC BLOOD PRESSURE: 100 MMHG | WEIGHT: 113 LBS | SYSTOLIC BLOOD PRESSURE: 158 MMHG

## 2020-10-15 DIAGNOSIS — M54.5 LOW BACK PAIN: ICD-10-CM

## 2020-10-15 DIAGNOSIS — M25.50 PAIN IN UNSPECIFIED JOINT: ICD-10-CM

## 2020-10-15 DIAGNOSIS — F41.9 ANXIETY DISORDER, UNSPECIFIED: ICD-10-CM

## 2020-10-15 DIAGNOSIS — Z12.11 ENCOUNTER FOR SCREENING FOR MALIGNANT NEOPLASM OF COLON: ICD-10-CM

## 2020-10-15 DIAGNOSIS — M25.569 PAIN IN UNSPECIFIED KNEE: ICD-10-CM

## 2020-10-15 DIAGNOSIS — Z78.0 ASYMPTOMATIC MENOPAUSAL STATE: ICD-10-CM

## 2020-10-15 DIAGNOSIS — Z01.818 ENCOUNTER FOR OTHER PREPROCEDURAL EXAMINATION: ICD-10-CM

## 2020-10-15 DIAGNOSIS — F32.9 ANXIETY DISORDER, UNSPECIFIED: ICD-10-CM

## 2020-10-15 DIAGNOSIS — M19.049 PRIMARY OSTEOARTHRITIS, UNSPECIFIED HAND: ICD-10-CM

## 2020-10-15 PROCEDURE — 99213 OFFICE O/P EST LOW 20 MIN: CPT

## 2020-10-15 PROCEDURE — 99205 OFFICE O/P NEW HI 60 MIN: CPT

## 2020-10-15 RX ORDER — CYANOCOBALAMIN/FOLIC AC/VIT B6 1-2.2-25MG
TABLET ORAL
Refills: 0 | Status: DISCONTINUED | COMMUNITY

## 2020-10-15 RX ORDER — PREDNISONE 50 MG/1
TABLET ORAL
Refills: 0 | Status: DISCONTINUED | COMMUNITY

## 2020-10-15 RX ORDER — GABAPENTIN 300 MG/1
300 CAPSULE ORAL
Qty: 30 | Refills: 2 | Status: DISCONTINUED | COMMUNITY
Start: 2020-06-04 | End: 2020-10-15

## 2020-10-15 NOTE — PHYSICAL EXAM
[Sclera] : the sclera and conjunctiva were normal [General Appearance - Well Nourished] : well nourished [General Appearance - Alert] : alert [Extraocular Movements] : extraocular movements were intact [Nasal Cavity] : the nasal mucosa and septum were normal [Outer Ear] : the ears and nose were normal in appearance [Neck Appearance] : the appearance of the neck was normal [Respiration, Rhythm And Depth] : normal respiratory rhythm and effort [Auscultation Breath Sounds / Voice Sounds] : lungs were clear to auscultation bilaterally [Heart Rate And Rhythm] : heart rate was normal and rhythm regular [Heart Sounds] : normal S1 and S2 [Abdomen Soft] : soft [Bowel Sounds] : normal bowel sounds [Abdomen Tenderness] : non-tender [Cervical Lymph Nodes Enlarged Anterior Bilaterally] : anterior cervical [No CVA Tenderness] : no ~M costovertebral angle tenderness [Supraclavicular Lymph Nodes Enlarged Bilaterally] : supraclavicular [Motor Tone] : muscle strength and tone were normal [Cranial Nerves] : cranial nerves 2-12 were intact [] : no rash [Impaired Insight] : insight and judgment were intact [No Focal Deficits] : no focal deficits [Mood] : the mood was normal [FreeTextEntry1] : some lumbosacral tenderness w/ decent ROM

## 2020-10-15 NOTE — REVIEW OF SYSTEMS
[As Noted in HPI] : as noted in HPI [Fever] : no fever [Eye Pain] : no eye pain [Chills] : no chills [Earache] : no earache [Red Eyes] : eyes not red [Shortness Of Breath] : no shortness of breath [Nosebleeds] : no nosebleeds [Chest Pain] : no chest pain [Vomiting] : no vomiting [Abdominal Pain] : no abdominal pain [Confused] : no confusion [Dysuria] : no dysuria [Proptosis] : no proptosis [Suicidal] : not suicidal [Easy Bleeding] : no tendency for easy bleeding

## 2020-10-15 NOTE — ASSESSMENT
[FreeTextEntry1] : 51 y/o AA female here w/ her son, Stewart w/  translating from English to hatian creo language reports of generalized aches/pain all over w/ "crawling sensation of the skin" w/ pain in the hands/knees/ LBP to rule out inflammatory arthropathy inlcd RA, seronegative spondyloarthropathy w/ likely Fibromyalgia. \par -chart reviewed w/ labs 8/28/2019 w/ hx of raised ESR= 32 w/ mildly raised CRP=0.48 then\par -No synovitis or effusion on exam noted today and advised to monitor.\par -labs as below incld ESR, CRP, serologies, lyme, TSH, B12\par -Referred for xray of b/l hands to evaluate for structural changes, r/o periarticular osteopenia/RA, r/o erosions\par \par Knee pain -Referred for xray of b/l knees to evaluate for structural changes, OA\par -consider steroid injections \par \par LBP: intermittent \par -Referred for xray of LS spine to evaluate for structural changes, DDD, confirm SI normal \par -Advil PRN w/ food needed sparingly helps\par \par Fibromyalgia:\par -on Amitriptyline 10mg bedtime and will adjust pending results \par -encouraged gentle exercises as tolerated\par \par Bone health: postmenopausal patient reports Dexa >2yrs ago w/ Dexa referral given today to evaluate for osteopenia/osteoporosis.\par \par -educated on symptoms to monitor for in detail and alert us if any concerns.\par -f/u in 10-14days w/ labs, xrays, Dexa please\par

## 2020-10-15 NOTE — HISTORY OF PRESENT ILLNESS
[FreeTextEntry1] : 51 y/o AA female here w/ her son, Stewart w/  translating from English to hatian creo language reports of generalized aches/pain all over w/ "crawling sensation of the skin."\par Patient states that she's been noticing this achiness for well over a year now.\par Patient states she notices some cramping with some soreness in her bilateral hands that can be throughout the hands.\par States he noticed some weakness sensation in her wrist w/o effusion. \par States she can notice some soreness in her elbows intermittently.\par States she did notice some soreness in her shoulders but full range of motion.\par Has full ROM in b/l shoulders, no pelvic/girdle stiffness and able to stand up without using her hands, no temporal pain/unremitting headaches, no vision changes, no jaw pain.\par Denies any neck pain.\par States he notices some lower back pain that she is unsure what makes it worse; better w/ having it rubbed.\par States she noticed some intermittent pain in the knees w/ walking or doing the stairs. Denies any crystal arthropathy attacks.\par Denies any pain or swelling in the ankles.\par Denies any fever/chills, no rashes, no ulcers, no dry eyes, no dry mouth, no raynaud's, no infectious diarrhea or  symptoms at this time.\par \par States she is postmenopausal with no recent DEXA in well over 2 years and would be interested in finding out about her need for calcium.\par

## 2020-10-16 ENCOUNTER — LABORATORY RESULT (OUTPATIENT)
Age: 50
End: 2020-10-16

## 2020-10-20 NOTE — PHYSICAL EXAM
[General Appearance - Alert] : alert [General Appearance - In No Acute Distress] : in no acute distress [Auscultation Breath Sounds / Voice Sounds] : lungs were clear to auscultation bilaterally [Heart Rate And Rhythm] : heart rate was normal and rhythm regular [Heart Sounds] : normal S1 and S2 [Heart Sounds Gallop] : no gallops [Murmurs] : no murmurs [Heart Sounds Pericardial Friction Rub] : no pericardial rub [Bowel Sounds] : normal bowel sounds [Abdomen Soft] : soft [] : no hepato-splenomegaly [Abdomen Tenderness] : non-tender [Abdomen Mass (___ Cm)] : no abdominal mass palpated [Abnormal Walk] : normal gait [Musculoskeletal - Swelling] : no joint swelling seen [Nail Clubbing] : no clubbing  or cyanosis of the fingernails [Motor Tone] : muscle strength and tone were normal [Oriented To Time, Place, And Person] : oriented to person, place, and time [Affect] : the affect was normal [Impaired Insight] : insight and judgment were intact

## 2020-10-20 NOTE — REVIEW OF SYSTEMS
[Sore Throat] : sore throat [Joint Pain] : joint pain [Negative] : Heme/Lymph [FreeTextEntry9] : neck, back pain

## 2020-10-20 NOTE — HISTORY OF PRESENT ILLNESS
[de-identified] : 49yo female with globus sensation\par She notes globus and throat burning. She has some anxiety \par She has been on elavil at night 10 mg with perhaps some improvement in her anxiety and thorat symptoms. She denies any side effects. She is seen again with her son translating and she has given her consent fo him to do so

## 2020-10-21 ENCOUNTER — OUTPATIENT (OUTPATIENT)
Dept: OUTPATIENT SERVICES | Facility: HOSPITAL | Age: 50
LOS: 1 days | End: 2020-10-21
Payer: MEDICAID

## 2020-10-21 ENCOUNTER — APPOINTMENT (OUTPATIENT)
Dept: RADIOLOGY | Facility: CLINIC | Age: 50
End: 2020-10-21
Payer: COMMERCIAL

## 2020-10-21 ENCOUNTER — RESULT REVIEW (OUTPATIENT)
Age: 50
End: 2020-10-21

## 2020-10-21 DIAGNOSIS — Z78.0 ASYMPTOMATIC MENOPAUSAL STATE: ICD-10-CM

## 2020-10-21 DIAGNOSIS — D72.819 DECREASED WHITE BLOOD CELL COUNT, UNSPECIFIED: ICD-10-CM

## 2020-10-21 DIAGNOSIS — Z00.8 ENCOUNTER FOR OTHER GENERAL EXAMINATION: ICD-10-CM

## 2020-10-21 DIAGNOSIS — M54.5 LOW BACK PAIN: ICD-10-CM

## 2020-10-21 DIAGNOSIS — M25.569 PAIN IN UNSPECIFIED KNEE: ICD-10-CM

## 2020-10-21 DIAGNOSIS — M19.049 PRIMARY OSTEOARTHRITIS, UNSPECIFIED HAND: ICD-10-CM

## 2020-10-21 PROCEDURE — 73120 X-RAY EXAM OF HAND: CPT | Mod: 26,LT,RT

## 2020-10-21 PROCEDURE — 73560 X-RAY EXAM OF KNEE 1 OR 2: CPT

## 2020-10-21 PROCEDURE — 73560 X-RAY EXAM OF KNEE 1 OR 2: CPT | Mod: 26,LT,RT

## 2020-10-21 PROCEDURE — 72100 X-RAY EXAM L-S SPINE 2/3 VWS: CPT | Mod: 26

## 2020-10-21 PROCEDURE — 73120 X-RAY EXAM OF HAND: CPT

## 2020-10-21 PROCEDURE — 72100 X-RAY EXAM L-S SPINE 2/3 VWS: CPT

## 2020-10-26 PROBLEM — D72.819 LEUKOPENIA: Status: ACTIVE | Noted: 2020-10-26

## 2020-10-26 LAB
25(OH)D3 SERPL-MCNC: 25.8 NG/ML
ACE BLD-CCNC: 33 U/L
ALBUMIN SERPL ELPH-MCNC: 4.4 G/DL
ALP BLD-CCNC: 78 U/L
ALT SERPL-CCNC: 12 U/L
ANA SER IF-ACNC: NEGATIVE
ANION GAP SERPL CALC-SCNC: 16 MMOL/L
AST SERPL-CCNC: 14 U/L
B BURGDOR AB SER-IMP: NEGATIVE
B BURGDOR IGM PATRN SER IB-IMP: NEGATIVE
B BURGDOR18KD IGG SER QL IB: NORMAL
B BURGDOR23KD IGG SER QL IB: NORMAL
B BURGDOR23KD IGM SER QL IB: NORMAL
B BURGDOR28KD IGG SER QL IB: NORMAL
B BURGDOR30KD IGG SER QL IB: NORMAL
B BURGDOR31KD IGG SER QL IB: NORMAL
B BURGDOR39KD IGG SER QL IB: NORMAL
B BURGDOR39KD IGM SER QL IB: NORMAL
B BURGDOR41KD IGG SER QL IB: PRESENT
B BURGDOR41KD IGM SER QL IB: PRESENT
B BURGDOR45KD IGG SER QL IB: NORMAL
B BURGDOR58KD IGG SER QL IB: PRESENT
B BURGDOR66KD IGG SER QL IB: NORMAL
B BURGDOR93KD IGG SER QL IB: NORMAL
B19V IGG SER QL IA: 3.4 INDEX
B19V IGG+IGM SER-IMP: NORMAL
B19V IGG+IGM SER-IMP: POSITIVE
B19V IGM FLD-ACNC: 0.3 INDEX
B19V IGM SER-ACNC: NEGATIVE
BASOPHILS # BLD AUTO: 0.02 K/UL
BASOPHILS NFR BLD AUTO: 0.5 %
BILIRUB SERPL-MCNC: 0.3 MG/DL
BUN SERPL-MCNC: 10 MG/DL
CALCIUM SERPL-MCNC: 9.9 MG/DL
CCP AB SER IA-ACNC: <8 UNITS
CENTROMERE IGG SER-ACNC: <0.2 CD:130001892
CHLORIDE SERPL-SCNC: 102 MMOL/L
CK SERPL-CCNC: 55 U/L
CO2 SERPL-SCNC: 25 MMOL/L
CREAT SERPL-MCNC: 0.93 MG/DL
CRP SERPL-MCNC: 0.36 MG/DL
EBV EA AB SER IA-ACNC: <5 U/ML
EBV EA AB TITR SER IF: POSITIVE
EBV EA IGG SER QL IA: 218 U/ML
EBV EA IGG SER-ACNC: NEGATIVE
EBV EA IGM SER IA-ACNC: NEGATIVE
EBV PATRN SPEC IB-IMP: NORMAL
EBV VCA IGG SER IA-ACNC: <10 U/ML
EBV VCA IGM SER QL IA: <10 U/ML
ENA RNP AB SER IA-ACNC: <0.2 AL
ENA SCL70 IGG SER IA-ACNC: <0.2 AL
ENA SM AB SER IA-ACNC: <0.2 AL
ENA SS-A AB SER IA-ACNC: <0.2 AL
ENA SS-B AB SER IA-ACNC: <0.2 AL
ENDOMYSIUM IGA SER QL: NEGATIVE
ENDOMYSIUM IGA TITR SER: NORMAL
EOSINOPHIL # BLD AUTO: 0.04 K/UL
EOSINOPHIL NFR BLD AUTO: 1.1 %
EPSTEIN-BARR VIRUS CAPSID ANTIGEN IGG: NEGATIVE
ERYTHROCYTE [SEDIMENTATION RATE] IN BLOOD BY WESTERGREN METHOD: 26 MM/HR
ESTIMATED AVERAGE GLUCOSE: 134 MG/DL
FOLATE SERPL-MCNC: 17.3 NG/ML
G6PD SER-CCNC: 12.5 U/G HGB
GLIADIN IGA SER QL: <5 UNITS
GLIADIN IGG SER QL: <5 UNITS
GLIADIN PEPTIDE IGA SER-ACNC: NEGATIVE
GLIADIN PEPTIDE IGG SER-ACNC: NEGATIVE
GLUCOSE SERPL-MCNC: 99 MG/DL
HAV IGM SER QL: NONREACTIVE
HBA1C MFR BLD HPLC: 6.3 %
HBV CORE IGM SER QL: NONREACTIVE
HBV SURFACE AG SER QL: NONREACTIVE
HCT VFR BLD CALC: 37.8 %
HCV AB SER QL: NONREACTIVE
HCV S/CO RATIO: 0.14 S/CO
HGB BLD-MCNC: 11.5 G/DL
HLA-B27 RELATED AG QL: NORMAL
IMM GRANULOCYTES NFR BLD AUTO: 0 %
LYMPHOCYTES # BLD AUTO: 2.01 K/UL
LYMPHOCYTES NFR BLD AUTO: 53.6 %
MAN DIFF?: NORMAL
MCHC RBC-ENTMCNC: 26.3 PG
MCHC RBC-ENTMCNC: 30.4 GM/DL
MCV RBC AUTO: 86.5 FL
MONOCYTES # BLD AUTO: 0.28 K/UL
MONOCYTES NFR BLD AUTO: 7.5 %
NEUTROPHILS # BLD AUTO: 1.4 K/UL
NEUTROPHILS NFR BLD AUTO: 37.3 %
PLATELET # BLD AUTO: 209 K/UL
POTASSIUM SERPL-SCNC: 4.3 MMOL/L
PROT SERPL-MCNC: 6.8 G/DL
RBC # BLD: 4.37 M/UL
RBC # FLD: 13.3 %
RF+CCP IGG SER-IMP: NEGATIVE
RHEUMATOID FACT SER QL: <10 IU/ML
SODIUM SERPL-SCNC: 142 MMOL/L
TSH SERPL-ACNC: 1.19 UIU/ML
TTG IGA SER IA-ACNC: <1.2 U/ML
TTG IGA SER-ACNC: NEGATIVE
TTG IGG SER IA-ACNC: 1.8 U/ML
TTG IGG SER IA-ACNC: NEGATIVE
VIT B12 SERPL-MCNC: 627 PG/ML
WBC # FLD AUTO: 3.75 K/UL

## 2020-11-20 ENCOUNTER — OUTPATIENT (OUTPATIENT)
Dept: OUTPATIENT SERVICES | Facility: HOSPITAL | Age: 50
LOS: 1 days | Discharge: HOME | End: 2020-11-20

## 2020-11-20 DIAGNOSIS — D72.819 DECREASED WHITE BLOOD CELL COUNT, UNSPECIFIED: ICD-10-CM

## 2020-11-20 DIAGNOSIS — Z87.898 PERSONAL HISTORY OF OTHER SPECIFIED CONDITIONS: ICD-10-CM

## 2020-11-23 ENCOUNTER — RESULT REVIEW (OUTPATIENT)
Age: 50
End: 2020-11-23

## 2020-11-23 ENCOUNTER — OUTPATIENT (OUTPATIENT)
Dept: OUTPATIENT SERVICES | Facility: HOSPITAL | Age: 50
LOS: 1 days | End: 2020-11-23

## 2020-11-23 ENCOUNTER — APPOINTMENT (OUTPATIENT)
Dept: HEMATOLOGY ONCOLOGY | Facility: CLINIC | Age: 50
End: 2020-11-23
Payer: COMMERCIAL

## 2020-11-23 VITALS
HEART RATE: 91 BPM | SYSTOLIC BLOOD PRESSURE: 166 MMHG | RESPIRATION RATE: 16 BRPM | HEIGHT: 63 IN | TEMPERATURE: 98 F | WEIGHT: 113 LBS | BODY MASS INDEX: 20.02 KG/M2 | DIASTOLIC BLOOD PRESSURE: 90 MMHG

## 2020-11-23 DIAGNOSIS — D70.9 NEUTROPENIA, UNSPECIFIED: ICD-10-CM

## 2020-11-23 DIAGNOSIS — D72.819 DECREASED WHITE BLOOD CELL COUNT, UNSPECIFIED: ICD-10-CM

## 2020-11-23 PROCEDURE — 99214 OFFICE O/P EST MOD 30 MIN: CPT

## 2020-11-23 RX ORDER — AMITRIPTYLINE HYDROCHLORIDE 10 MG/1
10 TABLET, FILM COATED ORAL
Qty: 30 | Refills: 3 | Status: DISCONTINUED | COMMUNITY
Start: 2020-09-03 | End: 2020-11-23

## 2020-11-23 RX ORDER — FAMOTIDINE 20 MG/1
20 TABLET, FILM COATED ORAL
Refills: 0 | Status: DISCONTINUED | COMMUNITY
End: 2020-11-23

## 2020-11-24 ENCOUNTER — LABORATORY RESULT (OUTPATIENT)
Age: 50
End: 2020-11-24

## 2020-12-01 ENCOUNTER — APPOINTMENT (OUTPATIENT)
Dept: GASTROENTEROLOGY | Facility: CLINIC | Age: 50
End: 2020-12-01
Payer: COMMERCIAL

## 2020-12-01 VITALS
BODY MASS INDEX: 20.02 KG/M2 | HEART RATE: 97 BPM | TEMPERATURE: 97 F | WEIGHT: 113 LBS | DIASTOLIC BLOOD PRESSURE: 99 MMHG | HEIGHT: 63 IN | SYSTOLIC BLOOD PRESSURE: 154 MMHG

## 2020-12-01 DIAGNOSIS — R13.10 DYSPHAGIA, UNSPECIFIED: ICD-10-CM

## 2020-12-01 PROCEDURE — 99213 OFFICE O/P EST LOW 20 MIN: CPT

## 2020-12-01 PROCEDURE — 99072 ADDL SUPL MATRL&STAF TM PHE: CPT

## 2020-12-01 NOTE — ASSESSMENT
[FreeTextEntry1] : 51 yo female with history of globus sensation. Will continue medications. Follow up in three months.

## 2020-12-01 NOTE — HISTORY OF PRESENT ILLNESS
[de-identified] : Ms. SARA FITZPATRICK is a 50 year old female with history of globus sensation. Translation by patient's son. Patient feels significantly better on current regimen. Notes some neck discomfort, but no true dysphagia or weight loss. \par

## 2020-12-07 ENCOUNTER — APPOINTMENT (OUTPATIENT)
Dept: HEMATOLOGY ONCOLOGY | Facility: CLINIC | Age: 50
End: 2020-12-07
Payer: COMMERCIAL

## 2020-12-07 VITALS
HEIGHT: 63 IN | DIASTOLIC BLOOD PRESSURE: 106 MMHG | TEMPERATURE: 97.7 F | SYSTOLIC BLOOD PRESSURE: 174 MMHG | WEIGHT: 110 LBS | BODY MASS INDEX: 19.49 KG/M2 | RESPIRATION RATE: 16 BRPM | HEART RATE: 98 BPM

## 2020-12-07 DIAGNOSIS — D70.9 NEUTROPENIA, UNSPECIFIED: ICD-10-CM

## 2020-12-07 DIAGNOSIS — R63.4 ABNORMAL WEIGHT LOSS: ICD-10-CM

## 2020-12-07 PROCEDURE — 99214 OFFICE O/P EST MOD 30 MIN: CPT

## 2020-12-07 NOTE — PHYSICAL EXAM
[Normal] : affect appropriate [de-identified] : looks  well  [de-identified] : no joint swelling or deformities [de-identified] : no overt rashes

## 2020-12-07 NOTE — HISTORY OF PRESENT ILLNESS
[de-identified] : patient speaks Divehi/ Creole. Used   services.\par 51 y/o [place]  female referred by her rheumatologist for mild leukopenia. \par CBC in Oct 2020 : WBC 3.75  ANC 1.4 rest of CBC normal  In 2017 WBC was 3.3 with ANC 1.1\par She has multiple and semi- chronic complaints. \par Generalized aches/ pains , " crawling sensation under the skin" for over one year. Seen by rheumatology.\par Evaluated by GI for globus sensation.\par Seen by neurology for intermittent R sided weakness and paresthesias for over two years. W/up inconclusive. Possible early  MS. ? fibromyalgia.\par  \par No fevers,  no chills, no rashes, no weight loss. \par She is postmenopausal. \par \par Hematologic w/up- negative. Flow cytometry normal. Smear unremarkable.  [de-identified] : Main complaint: sensation of " crawling under her skin"- all over. Makes her not feel well, loosing her appetite. \par She lost 15 lbs in one year.

## 2020-12-07 NOTE — REVIEW OF SYSTEMS
[Patient Intake Form Reviewed] : Patient intake form was reviewed [Fatigue] : fatigue [Joint Pain] : joint pain [Joint Stiffness] : joint stiffness [Muscle Weakness] : muscle weakness [Negative] : Psychiatric [FreeTextEntry7] : reflux, cramping constipation  [de-identified] : per HPI

## 2020-12-07 NOTE — ASSESSMENT
[FreeTextEntry1] : 49 y/o [place] female with  isolated borderline leukopenia/ mild neutropenia documented for at least 3 years and stable. \par No B symptoms. Chronic rheumatologic and neurologic sx. R/o fibromyalgia. R/o MS ?.\par \par Mild chronic leukopenia/ neutropenia. Normal blood morphology. Normal flow cytometry.\par Differential diagnosis:\par - low normal normal range for her ethnic type\par - mild form of immune mediated neutropenia/ versus idiopathic versus cyclic. \par NO symptoms : no frequent recurrent infection. No oral ulcers.\par No need for any treatment intervention.\par Dose not explain her symptoms.\par \par I am concerned about her unintentional weight loss. \par Will obtain CT chest, abdomen and pelvis r/o paraneoplastic  sx/ occult malignancy.\par If scans negative ( I will call patient)- she can continue to follow up with PCP- yearly CBC/ diff. Re-evaluate if significant change.\par \par Discussed with patient.

## 2020-12-07 NOTE — ASSESSMENT
[FreeTextEntry1] : 51 y/o [place] female with  isolated borderline leukopenia/ mild neutropenia documented for at least 3 years and stable. \par No B symptoms. Chronic rheumatologic and neurologic sx. R/o fibromyalgia. R/o MS ?.\par \par Mild chronic leukopenia/ neutropenia. Normal blood morphology.\par Differential diagnosis:\par - low normal normal range for her ethnic type\par - mild form of immune mediated neutropenia/ versus idiopathic versus cyclic. \par NO symptoms : no frequent recurrent infection. No oral ulcers.\par No need for any treatment intervention.\par Dose not explain her multiple complaints.\par Plan : for completeness- will obtain flow cytometry to r/o abnormal/ clonal population. \par If normal- monitor periodically with CBC every 6-12 months. \par \par Discussed with patient.

## 2020-12-07 NOTE — REVIEW OF SYSTEMS
[Patient Intake Form Reviewed] : Patient intake form was reviewed [Fatigue] : fatigue [Joint Pain] : joint pain [Joint Stiffness] : joint stiffness [Muscle Weakness] : muscle weakness [Negative] : Psychiatric [FreeTextEntry7] : reflux, cramping constipation  [de-identified] : per HPI

## 2020-12-14 ENCOUNTER — OUTPATIENT (OUTPATIENT)
Dept: OUTPATIENT SERVICES | Facility: HOSPITAL | Age: 50
LOS: 1 days | End: 2020-12-14
Payer: MEDICAID

## 2020-12-14 ENCOUNTER — APPOINTMENT (OUTPATIENT)
Dept: RADIOLOGY | Facility: CLINIC | Age: 50
End: 2020-12-14
Payer: COMMERCIAL

## 2020-12-14 DIAGNOSIS — R76.11 NONSPECIFIC REACTION TO TUBERCULIN SKIN TEST WITHOUT ACTIVE TUBERCULOSIS: ICD-10-CM

## 2020-12-14 PROCEDURE — 71046 X-RAY EXAM CHEST 2 VIEWS: CPT | Mod: 26

## 2020-12-14 PROCEDURE — 71046 X-RAY EXAM CHEST 2 VIEWS: CPT

## 2020-12-17 ENCOUNTER — APPOINTMENT (OUTPATIENT)
Dept: RHEUMATOLOGY | Facility: CLINIC | Age: 50
End: 2020-12-17

## 2020-12-22 ENCOUNTER — APPOINTMENT (OUTPATIENT)
Dept: CT IMAGING | Facility: CLINIC | Age: 50
End: 2020-12-22

## 2020-12-22 ENCOUNTER — OUTPATIENT (OUTPATIENT)
Dept: OUTPATIENT SERVICES | Facility: HOSPITAL | Age: 50
LOS: 1 days | End: 2020-12-22

## 2020-12-22 DIAGNOSIS — Z00.8 ENCOUNTER FOR OTHER GENERAL EXAMINATION: ICD-10-CM

## 2020-12-23 PROBLEM — Z01.818 ENCOUNTER FOR DIAGNOSTIC ENDOSCOPY: Status: RESOLVED | Noted: 2020-10-15 | Resolved: 2020-12-23

## 2020-12-23 PROBLEM — Z12.11 ENCOUNTER FOR SCREENING COLONOSCOPY: Status: RESOLVED | Noted: 2020-10-15 | Resolved: 2020-12-23

## 2020-12-30 ENCOUNTER — APPOINTMENT (OUTPATIENT)
Dept: CT IMAGING | Facility: CLINIC | Age: 50
End: 2020-12-30
Payer: COMMERCIAL

## 2020-12-30 ENCOUNTER — APPOINTMENT (OUTPATIENT)
Dept: CT IMAGING | Facility: CLINIC | Age: 50
End: 2020-12-30

## 2020-12-30 ENCOUNTER — OUTPATIENT (OUTPATIENT)
Dept: OUTPATIENT SERVICES | Facility: HOSPITAL | Age: 50
LOS: 1 days | End: 2020-12-30
Payer: MEDICAID

## 2020-12-30 DIAGNOSIS — R63.4 ABNORMAL WEIGHT LOSS: ICD-10-CM

## 2020-12-30 PROCEDURE — 74177 CT ABD & PELVIS W/CONTRAST: CPT

## 2020-12-30 PROCEDURE — 71260 CT THORAX DX C+: CPT | Mod: 26

## 2020-12-30 PROCEDURE — 82565 ASSAY OF CREATININE: CPT

## 2020-12-30 PROCEDURE — 74177 CT ABD & PELVIS W/CONTRAST: CPT | Mod: 26

## 2020-12-30 PROCEDURE — 71260 CT THORAX DX C+: CPT

## 2021-01-06 ENCOUNTER — APPOINTMENT (OUTPATIENT)
Dept: GASTROENTEROLOGY | Facility: CLINIC | Age: 51
End: 2021-01-06
Payer: COMMERCIAL

## 2021-01-06 VITALS
BODY MASS INDEX: 19.49 KG/M2 | WEIGHT: 110 LBS | HEART RATE: 97 BPM | HEIGHT: 63 IN | SYSTOLIC BLOOD PRESSURE: 148 MMHG | DIASTOLIC BLOOD PRESSURE: 95 MMHG

## 2021-01-06 DIAGNOSIS — R93.3 ABNORMAL FINDINGS ON DIAGNOSTIC IMAGING OF OTHER PARTS OF DIGESTIVE TRACT: ICD-10-CM

## 2021-01-06 PROCEDURE — 99213 OFFICE O/P EST LOW 20 MIN: CPT

## 2021-01-06 PROCEDURE — 99072 ADDL SUPL MATRL&STAF TM PHE: CPT

## 2021-01-06 NOTE — HISTORY OF PRESENT ILLNESS
[de-identified] : Ms. SARA FITZPATRICK is a 50 year old female with history of globus sensation. The patient has noted improvement on amitriptyline. There is no true dysphagia. Patient did see her hematologist where she was noted to have had some weight loss. CT scan of the chest abdomen and pelvis showed a density in the spleen. This was not found in the past by report. \par

## 2021-01-15 ENCOUNTER — RESULT REVIEW (OUTPATIENT)
Age: 51
End: 2021-01-15

## 2021-01-15 ENCOUNTER — OUTPATIENT (OUTPATIENT)
Dept: OUTPATIENT SERVICES | Facility: HOSPITAL | Age: 51
LOS: 1 days | End: 2021-01-15
Payer: MEDICAID

## 2021-01-15 ENCOUNTER — APPOINTMENT (OUTPATIENT)
Dept: MRI IMAGING | Facility: CLINIC | Age: 51
End: 2021-01-15
Payer: COMMERCIAL

## 2021-01-15 DIAGNOSIS — R93.3 ABNORMAL FINDINGS ON DIAGNOSTIC IMAGING OF OTHER PARTS OF DIGESTIVE TRACT: ICD-10-CM

## 2021-01-15 PROCEDURE — A9585: CPT

## 2021-01-15 PROCEDURE — 74183 MRI ABD W/O CNTR FLWD CNTR: CPT | Mod: 26

## 2021-01-15 PROCEDURE — 74183 MRI ABD W/O CNTR FLWD CNTR: CPT

## 2021-01-20 RX ORDER — MULTIVIT-MIN/FERROUS GLUCONATE 9 MG/15 ML
1 LIQUID (ML) ORAL
Qty: 0 | Refills: 0 | DISCHARGE

## 2021-01-20 RX ORDER — PANTOPRAZOLE SODIUM 20 MG/1
1 TABLET, DELAYED RELEASE ORAL
Qty: 0 | Refills: 0 | DISCHARGE

## 2021-01-20 RX ORDER — OMEPRAZOLE 10 MG/1
1 CAPSULE, DELAYED RELEASE ORAL
Qty: 0 | Refills: 0 | DISCHARGE

## 2021-01-20 RX ORDER — ALUMINUM HYDROXIDE AND MAGNESIUM TRISILICATE 80; 14.2 MG/1; MG/1
1 TABLET, CHEWABLE ORAL
Qty: 0 | Refills: 0 | DISCHARGE

## 2021-01-20 RX ORDER — AMLODIPINE BESYLATE 2.5 MG/1
1 TABLET ORAL
Qty: 0 | Refills: 0 | DISCHARGE

## 2021-01-20 RX ORDER — LOSARTAN POTASSIUM 100 MG/1
1 TABLET, FILM COATED ORAL
Qty: 0 | Refills: 0 | DISCHARGE

## 2021-02-25 PROBLEM — I10 ESSENTIAL (PRIMARY) HYPERTENSION: Chronic | Status: ACTIVE | Noted: 2018-01-16

## 2021-03-03 ENCOUNTER — APPOINTMENT (OUTPATIENT)
Dept: RHEUMATOLOGY | Facility: CLINIC | Age: 51
End: 2021-03-03

## 2021-04-22 ENCOUNTER — APPOINTMENT (OUTPATIENT)
Dept: GASTROENTEROLOGY | Facility: CLINIC | Age: 51
End: 2021-04-22

## 2021-04-26 ENCOUNTER — APPOINTMENT (OUTPATIENT)
Dept: ENDOCRINOLOGY | Facility: CLINIC | Age: 51
End: 2021-04-26
Payer: COMMERCIAL

## 2021-04-26 VITALS
HEART RATE: 87 BPM | RESPIRATION RATE: 16 BRPM | DIASTOLIC BLOOD PRESSURE: 90 MMHG | SYSTOLIC BLOOD PRESSURE: 124 MMHG | OXYGEN SATURATION: 98 %

## 2021-04-26 VITALS — HEIGHT: 63 IN | TEMPERATURE: 98.5 F | WEIGHT: 114 LBS | BODY MASS INDEX: 20.2 KG/M2

## 2021-04-26 DIAGNOSIS — R23.2 FLUSHING: ICD-10-CM

## 2021-04-26 DIAGNOSIS — R53.83 OTHER FATIGUE: ICD-10-CM

## 2021-04-26 PROCEDURE — 99204 OFFICE O/P NEW MOD 45 MIN: CPT | Mod: 25

## 2021-04-26 PROCEDURE — 99072 ADDL SUPL MATRL&STAF TM PHE: CPT

## 2021-04-26 RX ORDER — AMITRIPTYLINE HYDROCHLORIDE 25 MG/1
25 TABLET, FILM COATED ORAL
Qty: 90 | Refills: 3 | Status: COMPLETED | COMMUNITY
Start: 2020-10-15 | End: 2021-04-26

## 2021-04-26 RX ORDER — GABAPENTIN 300 MG/1
300 CAPSULE ORAL
Qty: 60 | Refills: 1 | Status: COMPLETED | COMMUNITY
Start: 2020-12-22 | End: 2021-04-26

## 2021-04-26 RX ORDER — PREDNISONE 10 MG/1
10 TABLET ORAL
Qty: 100 | Refills: 0 | Status: COMPLETED | COMMUNITY
Start: 2020-11-23 | End: 2021-04-26

## 2021-04-26 RX ORDER — LANSOPRAZOLE 30 MG/1
30 CAPSULE, DELAYED RELEASE ORAL DAILY
Qty: 90 | Refills: 3 | Status: COMPLETED | COMMUNITY
End: 2021-04-26

## 2021-04-26 RX ORDER — SUCRALFATE 1 G/10ML
1 SUSPENSION ORAL 4 TIMES DAILY
Qty: 1 | Refills: 3 | Status: COMPLETED | COMMUNITY
Start: 2020-07-02 | End: 2021-04-26

## 2021-04-30 ENCOUNTER — LABORATORY RESULT (OUTPATIENT)
Age: 51
End: 2021-04-30

## 2021-05-04 ENCOUNTER — NON-APPOINTMENT (OUTPATIENT)
Age: 51
End: 2021-05-04

## 2021-05-20 ENCOUNTER — APPOINTMENT (OUTPATIENT)
Dept: GASTROENTEROLOGY | Facility: CLINIC | Age: 51
End: 2021-05-20
Payer: COMMERCIAL

## 2021-05-20 VITALS — HEIGHT: 63 IN | WEIGHT: 115 LBS | BODY MASS INDEX: 20.38 KG/M2

## 2021-05-20 DIAGNOSIS — Z09 ENCOUNTER FOR FOLLOW-UP EXAMINATION AFTER COMPLETED TREATMENT FOR CONDITIONS OTHER THAN MALIGNANT NEOPLASM: ICD-10-CM

## 2021-05-20 DIAGNOSIS — R07.9 CHEST PAIN, UNSPECIFIED: ICD-10-CM

## 2021-05-20 PROCEDURE — 99213 OFFICE O/P EST LOW 20 MIN: CPT

## 2021-05-23 PROBLEM — Z09 FOLLOW UP: Status: ACTIVE | Noted: 2021-05-20

## 2021-05-23 NOTE — HISTORY OF PRESENT ILLNESS
[de-identified] : 50 yo female with epigastric burning and gerd\par \par She has been on PPI intermittently \par Now with recurrent discomfort

## 2021-05-23 NOTE — REVIEW OF SYSTEMS
[Chest Pain] : chest pain [Abdominal Pain] : abdominal pain [Heartburn] : heartburn [Negative] : Heme/Lymph

## 2021-05-23 NOTE — ASSESSMENT
[FreeTextEntry1] : 52yo female with worsening gerd, dyspepsia and atypical chest pain\par \par Will add carafate empirically\par Her symptoms seem at times our of proportion to known disease  - consider further evaluation if not helped by carafate

## 2021-05-23 NOTE — PHYSICAL EXAM
[General Appearance - Alert] : alert [General Appearance - In No Acute Distress] : in no acute distress [Auscultation Breath Sounds / Voice Sounds] : lungs were clear to auscultation bilaterally [Heart Sounds] : normal S1 and S2 [Heart Rate And Rhythm] : heart rate was normal and rhythm regular [Heart Sounds Gallop] : no gallops [Murmurs] : no murmurs [Heart Sounds Pericardial Friction Rub] : no pericardial rub [Bowel Sounds] : normal bowel sounds [Abdomen Soft] : soft [Abdomen Tenderness] : non-tender [] : no hepato-splenomegaly [Abdomen Mass (___ Cm)] : no abdominal mass palpated [Abnormal Walk] : normal gait [Musculoskeletal - Swelling] : no joint swelling seen [Nail Clubbing] : no clubbing  or cyanosis of the fingernails [Motor Tone] : muscle strength and tone were normal [Oriented To Time, Place, And Person] : oriented to person, place, and time [Impaired Insight] : insight and judgment were intact [Affect] : the affect was normal

## 2021-06-24 ENCOUNTER — APPOINTMENT (OUTPATIENT)
Dept: GASTROENTEROLOGY | Facility: CLINIC | Age: 51
End: 2021-06-24

## 2021-06-25 ENCOUNTER — APPOINTMENT (OUTPATIENT)
Dept: MAMMOGRAPHY | Facility: CLINIC | Age: 51
End: 2021-06-25
Payer: COMMERCIAL

## 2021-06-25 ENCOUNTER — OUTPATIENT (OUTPATIENT)
Dept: OUTPATIENT SERVICES | Facility: HOSPITAL | Age: 51
LOS: 1 days | End: 2021-06-25
Payer: MEDICAID

## 2021-06-25 DIAGNOSIS — Z12.31 ENCOUNTER FOR SCREENING MAMMOGRAM FOR MALIGNANT NEOPLASM OF BREAST: ICD-10-CM

## 2021-06-25 PROCEDURE — 77067 SCR MAMMO BI INCL CAD: CPT

## 2021-06-25 PROCEDURE — 77067 SCR MAMMO BI INCL CAD: CPT | Mod: 26

## 2021-06-25 PROCEDURE — 77063 BREAST TOMOSYNTHESIS BI: CPT | Mod: 26

## 2021-06-25 PROCEDURE — 77063 BREAST TOMOSYNTHESIS BI: CPT

## 2021-07-09 ENCOUNTER — APPOINTMENT (OUTPATIENT)
Dept: NEUROLOGY | Facility: CLINIC | Age: 51
End: 2021-07-09

## 2021-07-28 ENCOUNTER — APPOINTMENT (OUTPATIENT)
Dept: NEUROLOGY | Facility: CLINIC | Age: 51
End: 2021-07-28
Payer: COMMERCIAL

## 2021-07-28 VITALS
WEIGHT: 115 LBS | HEIGHT: 63 IN | BODY MASS INDEX: 20.38 KG/M2 | DIASTOLIC BLOOD PRESSURE: 74 MMHG | SYSTOLIC BLOOD PRESSURE: 122 MMHG | HEART RATE: 90 BPM

## 2021-07-28 DIAGNOSIS — G56.01 CARPAL TUNNEL SYNDROME, RIGHT UPPER LIMB: ICD-10-CM

## 2021-07-28 DIAGNOSIS — M79.7 FIBROMYALGIA: ICD-10-CM

## 2021-07-28 PROCEDURE — 99204 OFFICE O/P NEW MOD 45 MIN: CPT

## 2021-07-28 NOTE — DISCUSSION/SUMMARY
[FreeTextEntry1] : 51-year-old woman who is here for initial consultation of burning sensation that is associated with lumps all over her body that moves and is intermittent.  Patient was advised to bring a video of this occurrence for review during her follow-up visit.  Her MRI does not suggest demyelinating lesion and patient has CSF studies that are not suggestive of multiple sclerosis.  Given her new onset of tongue burning sensation will repeat MRI of the head.  Patient should have an increase in amitriptyline to perhaps a higher level to help with her symptoms.  I will have my staff send my consult note to Dr. Horton. She will fu after study's completed.\par \par I spent the time noted on the day of this patient encounter preparing for, providing and documenting the above E/M service and counseling and educate patient on differential, workup, disease course, and treatment/management. Education was provided to the patient during this encounter. All questions and concerns were answered and addressed in detail. The patient verbalized understanding and agreed to plan. Patient was advised to continue to monitor for neurologic symptoms and to notify my office or go to the nearest emergency room if there are any changes. Any orders/referrals and communications were provided as well. \par Side effects of the above medications were discussed in detail including but not limited to applicable black box warning and teratogenicity as appropriate. \par Patient was advised to bring previous records to my office. \par \par \par

## 2021-07-28 NOTE — REASON FOR VISIT
[Consultation] : a consultation visit [Family Member] : family member [Other: _____] : [unfilled] [[employer] ] : provided by [employer]   [FreeTextEntry1] : [id number]

## 2021-07-28 NOTE — HISTORY OF PRESENT ILLNESS
[FreeTextEntry1] : 51-year-old woman who is here for initial consultation with me for a sensation of burning pain that is moving under her skin all over her body.  Patient states that it started about a year ago and whenever she feels a sensation she could see and feel lumps all over her body that is intermittent and moves around.  Patient has no pictures regarding these lumps and she was advised to take a video or picture of this for her follow-up visit.  Patient states that she also has a sensation as if her tongue is inflamed and initially son said that her tongue was white however upon examination her tongue is not covered with any white growth.  Patient denies any social or medication changes that might have triggered the symptoms about a year ago.  Patient saw Dr. Diaz and had extensive work-up including MRI of the brain and cervical spine done in 2019 which showed minimal punctate foci of signal hyperintensity on T2 and flair in the periventricular and subcortical white matter.  Patient's CAT scan of the head in 2019 was unremarkable.  Patient also had CSF which showed RBC of 81.3 and WBC of less than 1.  Protein of 156 , IgG 101.4 Igg/ albumin ratio of 4.19.  IgG synthetase was 486.1.  IgG index was 14.6. MBP was less than 2 and glucose was 77.  Patient had no oligoclonal bands and it was negative for Lyme and VDRL.\par \par \par blood tests 8/28/19: NMO Ab negative, cryoglobulin negative, vitamin B12 642, vitamin D 24.9, vitamin B6 11.2, vitamin B1 82.2, ESR 32, \par RF negative, C-RP 0.48, SPEP negative, anti dsDNA negative, INEZ negative, Sjogren's antibodies negative\par \par MRI thoracic with and without contrast and lumbar spine 8/29/19: No intrinsic cord signal abnormality in the thoracic spine. No abnormal intrathecal enhancement.\par Normal appearance of the conus medullaris and cauda equina nerve roots. \par \par \par EMG right upper and lower extremities 11/21/19: \par right median nerve entrapment at the wrist\par No evidence of underlying peripheral neuropathy, cervical or lumbar radiculopathy on the right\par  \par \par Patient never obtained the repeat MRI to evaluate for worsening the white matter disease.\par \par She never wore the wrist splints for her carpal tunnel syndrome that was diagnosed on nerve conduction studies.\par \par Patient went to rheumatology who confirmed her diagnosis of fibromyalgia for which she was on amitriptyline 10 mg that was started by her PMD.  Patient was given gabapentin by Dr. Diaz but she never started the medication.

## 2021-07-28 NOTE — PHYSICAL EXAM
[General Appearance - Alert] : alert [Oriented To Time, Place, And Person] : oriented to person, place, and time [Person] : oriented to person [Place] : oriented to place [Time] : oriented to time [Short Term Intact] : short term memory intact [Fluency] : fluency intact [Current Events] : adequate knowledge of current events [Cranial Nerves Optic (II)] : visual acuity intact bilaterally,  visual fields full to confrontation, pupils equal round and reactive to light [Cranial Nerves Oculomotor (III)] : extraocular motion intact [Cranial Nerves Facial (VII)] : face symmetrical [Cranial Nerves Trigeminal (V)] : facial sensation intact symmetrically [Cranial Nerves Vestibulocochlear (VIII)] : hearing was intact bilaterally [Cranial Nerves Accessory (XI - Cranial And Spinal)] : head turning and shoulder shrug symmetric [Cranial Nerves Hypoglossal (XII)] : there was no tongue deviation with protrusion [Motor Tone] : muscle tone was normal in all four extremities [Motor Strength] : muscle strength was normal in all four extremities [Sensation Tactile Decrease] : light touch was intact [Abnormal Walk] : normal gait [Coordination - Dysmetria Impaired Finger-to-Nose Bilateral] : not present [1+] : Patella left 1+ [FreeTextEntry5] : No white covering on tongue

## 2021-08-03 ENCOUNTER — OUTPATIENT (OUTPATIENT)
Dept: OUTPATIENT SERVICES | Facility: HOSPITAL | Age: 51
LOS: 1 days | End: 2021-08-03
Payer: MEDICAID

## 2021-08-03 ENCOUNTER — APPOINTMENT (OUTPATIENT)
Dept: MRI IMAGING | Facility: CLINIC | Age: 51
End: 2021-08-03
Payer: COMMERCIAL

## 2021-08-03 DIAGNOSIS — R20.2 PARESTHESIA OF SKIN: ICD-10-CM

## 2021-08-03 PROCEDURE — 70551 MRI BRAIN STEM W/O DYE: CPT

## 2021-08-03 PROCEDURE — 70551 MRI BRAIN STEM W/O DYE: CPT | Mod: 26

## 2021-08-06 NOTE — ASU PREOP CHECKLIST - WAS PATIENT ON BETA BLOCKER?
Stress test results requested from [facility].    Recommend Vitamin D 5000 units daily per Dr. Cerna.  Patient notified.   No

## 2021-08-10 ENCOUNTER — OUTPATIENT (OUTPATIENT)
Dept: OUTPATIENT SERVICES | Facility: HOSPITAL | Age: 51
LOS: 1 days | End: 2021-08-10
Payer: MEDICAID

## 2021-08-10 ENCOUNTER — APPOINTMENT (OUTPATIENT)
Dept: MAMMOGRAPHY | Facility: CLINIC | Age: 51
End: 2021-08-10
Payer: COMMERCIAL

## 2021-08-10 ENCOUNTER — APPOINTMENT (OUTPATIENT)
Dept: ULTRASOUND IMAGING | Facility: CLINIC | Age: 51
End: 2021-08-10
Payer: COMMERCIAL

## 2021-08-10 DIAGNOSIS — Z00.8 ENCOUNTER FOR OTHER GENERAL EXAMINATION: ICD-10-CM

## 2021-08-10 PROCEDURE — 77065 DX MAMMO INCL CAD UNI: CPT | Mod: 26,LT

## 2021-08-10 PROCEDURE — G0279: CPT | Mod: 26

## 2021-08-10 PROCEDURE — 76642 ULTRASOUND BREAST LIMITED: CPT | Mod: 26,LT

## 2021-08-10 PROCEDURE — 77065 DX MAMMO INCL CAD UNI: CPT

## 2021-08-10 PROCEDURE — 76642 ULTRASOUND BREAST LIMITED: CPT

## 2021-08-10 PROCEDURE — G0279: CPT

## 2021-08-17 ENCOUNTER — EMERGENCY (EMERGENCY)
Facility: HOSPITAL | Age: 51
LOS: 0 days | Discharge: HOME | End: 2021-08-17
Attending: EMERGENCY MEDICINE
Payer: COMMERCIAL

## 2021-08-17 VITALS
HEART RATE: 68 BPM | RESPIRATION RATE: 18 BRPM | DIASTOLIC BLOOD PRESSURE: 84 MMHG | TEMPERATURE: 98 F | OXYGEN SATURATION: 100 % | SYSTOLIC BLOOD PRESSURE: 166 MMHG

## 2021-08-17 VITALS
DIASTOLIC BLOOD PRESSURE: 82 MMHG | SYSTOLIC BLOOD PRESSURE: 142 MMHG | HEART RATE: 72 BPM | OXYGEN SATURATION: 100 % | TEMPERATURE: 98 F | RESPIRATION RATE: 18 BRPM

## 2021-08-17 DIAGNOSIS — Z79.899 OTHER LONG TERM (CURRENT) DRUG THERAPY: ICD-10-CM

## 2021-08-17 DIAGNOSIS — L29.9 PRURITUS, UNSPECIFIED: ICD-10-CM

## 2021-08-17 DIAGNOSIS — I10 ESSENTIAL (PRIMARY) HYPERTENSION: ICD-10-CM

## 2021-08-17 PROCEDURE — 99282 EMERGENCY DEPT VISIT SF MDM: CPT

## 2021-08-17 NOTE — ED PROVIDER NOTE - OBJECTIVE STATEMENT
pt presents for itchiness all over body intermittent x 2 years no prior treatment. denies fever. denies HA or neck pain. no chest pain or sob. no abd pain. no n/v/d. no urinary f/u/d. no back pain. no motor or sensory deficits. denies illicit drug use. no recent travel. no rash on presentation . no other acute issues symptoms or concerns

## 2021-08-17 NOTE — ED ADULT NURSE NOTE - CHIEF COMPLAINT QUOTE
Pt ambulatory to ED c/o "pain all over her body" and itching. When asked when this started, pt states two years ago. Pt has not taken anything for pain or the itchiness. Denies chest pain, no SOB. Pt unable to describe her pain, states sometimes she burps, not really answering assessment questions.

## 2021-08-17 NOTE — ED ADULT TRIAGE NOTE - CHIEF COMPLAINT QUOTE
Pt ambulatory to ED c/o "pain all over her body" and itching. When asked when this started, pt states two years ago. Pt has not taken anything for pain or the itchiness. Denies chest pain, no SOB. Pt unable to describe her pain., not really answering assessment questions. Pt ambulatory to ED c/o "pain all over her body" and itching. When asked when this started, pt states two years ago. Pt has not taken anything for pain or the itchiness. Denies chest pain, no SOB. Pt unable to describe her pain, states sometimes she burps, not really answering assessment questions.

## 2021-08-17 NOTE — ED PROVIDER NOTE - PATIENT PORTAL LINK FT
You can access the FollowMyHealth Patient Portal offered by [facility] by registering at the following website: [email or web address]. By joining [employer]’s FollowMyHealth portal, you will also be able to view your health information using other applications (apps) compatible with our system.

## 2021-08-26 ENCOUNTER — RESULT REVIEW (OUTPATIENT)
Age: 51
End: 2021-08-26

## 2021-08-27 ENCOUNTER — APPOINTMENT (OUTPATIENT)
Age: 51
End: 2021-08-27

## 2021-08-30 ENCOUNTER — APPOINTMENT (OUTPATIENT)
Dept: GASTROENTEROLOGY | Facility: CLINIC | Age: 51
End: 2021-08-30

## 2021-10-13 ENCOUNTER — APPOINTMENT (OUTPATIENT)
Dept: DERMATOLOGY | Facility: CLINIC | Age: 51
End: 2021-10-13

## 2021-10-25 ENCOUNTER — LABORATORY RESULT (OUTPATIENT)
Age: 51
End: 2021-10-25

## 2021-10-25 ENCOUNTER — APPOINTMENT (OUTPATIENT)
Dept: DERMATOLOGY | Facility: CLINIC | Age: 51
End: 2021-10-25
Payer: COMMERCIAL

## 2021-10-25 VITALS — WEIGHT: 130 LBS | HEIGHT: 64 IN | BODY MASS INDEX: 22.2 KG/M2

## 2021-10-25 DIAGNOSIS — L30.9 DERMATITIS, UNSPECIFIED: ICD-10-CM

## 2021-10-25 DIAGNOSIS — L29.9 PRURITUS, UNSPECIFIED: ICD-10-CM

## 2021-10-25 PROCEDURE — 99204 OFFICE O/P NEW MOD 45 MIN: CPT

## 2021-10-29 LAB
ALBUMIN MFR SERPL ELPH: 60.1 %
ALBUMIN SERPL-MCNC: 4.4 G/DL
ALBUMIN/GLOB SERPL: 1.5 RATIO
ALPHA1 GLOB MFR SERPL ELPH: 3.6 %
ALPHA1 GLOB SERPL ELPH-MCNC: 0.3 G/DL
ALPHA2 GLOB MFR SERPL ELPH: 8.8 %
ALPHA2 GLOB SERPL ELPH-MCNC: 0.7 G/DL
ANION GAP SERPL CALC-SCNC: 12 MMOL/L
B-GLOBULIN MFR SERPL ELPH: 11.2 %
B-GLOBULIN SERPL ELPH-MCNC: 0.8 G/DL
BASOPHILS # BLD AUTO: 0.02 K/UL
BASOPHILS NFR BLD AUTO: 0.4 %
BUN SERPL-MCNC: 12 MG/DL
CALCIUM SERPL-MCNC: 9.9 MG/DL
CHLORIDE SERPL-SCNC: 104 MMOL/L
CO2 SERPL-SCNC: 27 MMOL/L
CREAT SERPL-MCNC: 0.74 MG/DL
EOSINOPHIL # BLD AUTO: 0.02 K/UL
EOSINOPHIL NFR BLD AUTO: 0.4 %
GAMMA GLOB FLD ELPH-MCNC: 1.2 G/DL
GAMMA GLOB MFR SERPL ELPH: 16.3 %
GLUCOSE SERPL-MCNC: 109 MG/DL
HBV CORE IGG+IGM SER QL: NONREACTIVE
HBV CORE IGM SER QL: NONREACTIVE
HBV SURFACE AB SER QL: NONREACTIVE
HBV SURFACE AG SER QL: NONREACTIVE
HCT VFR BLD CALC: 37.7 %
HCV AB SER QL: NONREACTIVE
HCV S/CO RATIO: 0.17 S/CO
HGB BLD-MCNC: 12 G/DL
HIV1+2 AB SPEC QL IA.RAPID: NONREACTIVE
IMM GRANULOCYTES NFR BLD AUTO: 0.2 %
INTERPRETATION SERPL IEP-IMP: NORMAL
LYMPHOCYTES # BLD AUTO: 1.31 K/UL
LYMPHOCYTES NFR BLD AUTO: 24.2 %
MAN DIFF?: NORMAL
MCHC RBC-ENTMCNC: 27.6 PG
MCHC RBC-ENTMCNC: 31.8 GM/DL
MCV RBC AUTO: 86.7 FL
MONOCYTES # BLD AUTO: 0.28 K/UL
MONOCYTES NFR BLD AUTO: 5.2 %
NEUTROPHILS # BLD AUTO: 3.77 K/UL
NEUTROPHILS NFR BLD AUTO: 69.6 %
PLATELET # BLD AUTO: 221 K/UL
POTASSIUM SERPL-SCNC: 4.3 MMOL/L
PROT SERPL-MCNC: 7.4 G/DL
PROT SERPL-MCNC: 7.4 G/DL
RBC # BLD: 4.35 M/UL
RBC # FLD: 13.8 %
SODIUM SERPL-SCNC: 143 MMOL/L
STRONGYLOIDES AB SER IA-ACNC: NEGATIVE
WBC # FLD AUTO: 5.41 K/UL

## 2021-11-01 ENCOUNTER — NON-APPOINTMENT (OUTPATIENT)
Age: 51
End: 2021-11-01

## 2021-11-01 LAB
ALBUPE: 19.7 %
ALPHA1UPE: 32 %
ALPHA2UPE: 20.2 %
BETAUPE: 16.3 %
GAMMAUPE: 11.8 %
IGA 24H UR QL IFE: NORMAL
KAPPA LC 24H UR QL: NORMAL
PROT PATTERN 24H UR ELPH-IMP: NORMAL
PROT UR-MCNC: 6 MG/DL
PROT UR-MCNC: 6 MG/DL

## 2021-12-23 ENCOUNTER — APPOINTMENT (OUTPATIENT)
Dept: MAMMOGRAPHY | Facility: CLINIC | Age: 51
End: 2021-12-23

## 2021-12-23 ENCOUNTER — APPOINTMENT (OUTPATIENT)
Dept: ULTRASOUND IMAGING | Facility: CLINIC | Age: 51
End: 2021-12-23

## 2022-01-12 ENCOUNTER — LABORATORY RESULT (OUTPATIENT)
Age: 52
End: 2022-01-12

## 2022-01-12 ENCOUNTER — APPOINTMENT (OUTPATIENT)
Dept: OBGYN | Facility: CLINIC | Age: 52
End: 2022-01-12
Payer: COMMERCIAL

## 2022-01-12 VITALS
HEART RATE: 84 BPM | RESPIRATION RATE: 18 BRPM | HEIGHT: 64 IN | SYSTOLIC BLOOD PRESSURE: 118 MMHG | DIASTOLIC BLOOD PRESSURE: 60 MMHG | BODY MASS INDEX: 20.32 KG/M2 | WEIGHT: 119 LBS | TEMPERATURE: 97.6 F

## 2022-01-12 DIAGNOSIS — Z01.419 ENCOUNTER FOR GYNECOLOGICAL EXAMINATION (GENERAL) (ROUTINE) W/OUT ABNORMAL FINDINGS: ICD-10-CM

## 2022-01-12 DIAGNOSIS — Z87.448 PERSONAL HISTORY OF OTHER DISEASES OF URINARY SYSTEM: ICD-10-CM

## 2022-01-12 DIAGNOSIS — K64.9 UNSPECIFIED HEMORRHOIDS: ICD-10-CM

## 2022-01-12 DIAGNOSIS — N95.1 MENOPAUSAL AND FEMALE CLIMACTERIC STATES: ICD-10-CM

## 2022-01-12 PROCEDURE — 99386 PREV VISIT NEW AGE 40-64: CPT

## 2022-01-12 PROCEDURE — 81003 URINALYSIS AUTO W/O SCOPE: CPT | Mod: QW

## 2022-01-12 NOTE — DISCUSSION/SUMMARY
[FreeTextEntry1] : 50 YO PRESENTS FOR [employer]. PT STATES HAS HX OF URINE IN BLOOD WOULD LIKE TO GET THAT CHECKED OUT, COMPLAINS OF HOT FLASHES AND HEMORRHOIDS \par CBE WNL, SBE TEACHING CONDUCTED WITH RETURN DEMONSTRATION\par \par PAP SMEAR COLLECTED WITHOUT COMPLICATION \par BIMANUAL EXAM COMPLETED, WNL \par \par URINE SENT TO LAB \par MAMMO REFERRAL GIVEN TO PATIENT FROM PCP \par DEXA REFERRAL GIVEN TO PATIENT FROM PCP \par COLONOSCOPY UP TO DATE AS PER PT\par PT STATES WILL SCHEDULE COLONOSCOPY- RX FOR HEMORRHOIDS SENT TO PHARMACY WITH INSTRUCTIONS ON USE \par RX EVAMIST SENT TO PHARMACY WITH INSTRUCTIONS ON USE \par ALL QUESTIONS ANSWERED TO PATIENT SATISFACTION \par \par RTO IN 3 MOS FOR F/U OR PRN \par

## 2022-01-12 NOTE — COUNSELING
[facility] calling pt protime results.   [Nutrition/ Exercise/ Weight Management] : nutrition, exercise, weight management [Body Image] : body image [Vitamins/Supplements] : vitamins/supplements [Breast Self Exam] : breast self exam [Medication Management] : medication management

## 2022-01-13 LAB
APPEARANCE: CLEAR
BILIRUBIN URINE: NEGATIVE
BLOOD URINE: NEGATIVE
COLOR: NORMAL
GLUCOSE QUALITATIVE U: NEGATIVE
KETONES URINE: NEGATIVE
LEUKOCYTE ESTERASE URINE: ABNORMAL
NITRITE URINE: NEGATIVE
PH URINE: 6
PROTEIN URINE: NEGATIVE
SPECIFIC GRAVITY URINE: 1.01
UROBILINOGEN URINE: NORMAL

## 2022-01-27 ENCOUNTER — NON-APPOINTMENT (OUTPATIENT)
Age: 52
End: 2022-01-27

## 2022-02-23 ENCOUNTER — OUTPATIENT (OUTPATIENT)
Dept: OUTPATIENT SERVICES | Facility: HOSPITAL | Age: 52
LOS: 1 days | End: 2022-02-23
Payer: COMMERCIAL

## 2022-02-23 ENCOUNTER — APPOINTMENT (OUTPATIENT)
Dept: MAMMOGRAPHY | Facility: CLINIC | Age: 52
End: 2022-02-23
Payer: COMMERCIAL

## 2022-02-23 DIAGNOSIS — R92.8 OTHER ABNORMAL AND INCONCLUSIVE FINDINGS ON DIAGNOSTIC IMAGING OF BREAST: ICD-10-CM

## 2022-02-23 PROCEDURE — G0279: CPT

## 2022-02-23 PROCEDURE — 77065 DX MAMMO INCL CAD UNI: CPT | Mod: 26,LT

## 2022-02-23 PROCEDURE — 77065 DX MAMMO INCL CAD UNI: CPT

## 2022-02-23 PROCEDURE — G0279: CPT | Mod: 26

## 2022-02-28 ENCOUNTER — APPOINTMENT (OUTPATIENT)
Dept: NEUROLOGY | Facility: CLINIC | Age: 52
End: 2022-02-28
Payer: COMMERCIAL

## 2022-02-28 ENCOUNTER — NON-APPOINTMENT (OUTPATIENT)
Age: 52
End: 2022-02-28

## 2022-02-28 VITALS
HEART RATE: 84 BPM | HEIGHT: 64 IN | DIASTOLIC BLOOD PRESSURE: 83 MMHG | SYSTOLIC BLOOD PRESSURE: 143 MMHG | BODY MASS INDEX: 23.9 KG/M2 | TEMPERATURE: 97.7 F | WEIGHT: 140 LBS

## 2022-02-28 DIAGNOSIS — R20.2 PARESTHESIA OF SKIN: ICD-10-CM

## 2022-02-28 DIAGNOSIS — R20.8 OTHER DISTURBANCES OF SKIN SENSATION: ICD-10-CM

## 2022-02-28 DIAGNOSIS — G60.8 OTHER HEREDITARY AND IDIOPATHIC NEUROPATHIES: ICD-10-CM

## 2022-02-28 PROCEDURE — 99214 OFFICE O/P EST MOD 30 MIN: CPT

## 2022-02-28 RX ORDER — SUCRALFATE 1 G/1
1 TABLET ORAL
Qty: 120 | Refills: 3 | Status: DISCONTINUED | COMMUNITY
Start: 2021-06-01 | End: 2022-02-28

## 2022-02-28 RX ORDER — HYDROCORTISONE 2.5% 25 MG/G
2.5 CREAM TOPICAL DAILY
Qty: 1 | Refills: 0 | Status: DISCONTINUED | COMMUNITY
Start: 2022-01-12 | End: 2022-02-28

## 2022-02-28 RX ORDER — SUCRALFATE 1 G/10ML
1 SUSPENSION ORAL 4 TIMES DAILY
Qty: 1 | Refills: 5 | Status: DISCONTINUED | COMMUNITY
Start: 2021-05-20 | End: 2022-02-28

## 2022-02-28 RX ORDER — LOSARTAN POTASSIUM 50 MG/1
50 TABLET, FILM COATED ORAL DAILY
Refills: 0 | Status: DISCONTINUED | COMMUNITY
Start: 2020-11-23 | End: 2022-02-28

## 2022-02-28 RX ORDER — ESTRADIOL 1 MG/G
1 GEL TOPICAL
Qty: 1 | Refills: 0 | Status: DISCONTINUED | COMMUNITY
Start: 2022-01-21 | End: 2022-02-28

## 2022-02-28 RX ORDER — AMITRIPTYLINE HYDROCHLORIDE 10 MG/1
10 TABLET, FILM COATED ORAL
Refills: 0 | Status: DISCONTINUED | COMMUNITY
End: 2022-02-28

## 2022-02-28 RX ORDER — ESTRADIOL 1.53 MG/1
1.53 SPRAY TRANSDERMAL
Qty: 1 | Refills: 3 | Status: DISCONTINUED | COMMUNITY
Start: 2022-01-12 | End: 2022-02-28

## 2022-02-28 NOTE — HISTORY OF PRESENT ILLNESS
[FreeTextEntry1] : She is a 51-year-old patient coming here for evaluation for burning sensation under her skin all over her body predominantly in both hands and lower extremities started a few years ago.\par [employer]  used for today's evaluation #[id number].\par Patient speaks Creole and states that her symptoms started several years ago started having symptoms involving both upper and lower extremities with burning dysesthesias and had been seen by several physicians including Dr. Diaz and Dr. Ruth Ann Davila and coming here today for evaluation.\par Since that she did not have any workup done but as per the chart patient had extensive workup including MRI brain, cervical spine and lumbar puncture and subsequently seen by Dr. Davila last year had MRI repeated of did not reveal any changes. Patient did not followup with them again.\par Seen by PCP and started on gabapentin 100 mg at bedtime which patient feels not helping her.\par Laboratory did not reveal any acute pathology. No headaches no dizziness no focal weakness.\par No bladder or bowel disturbance.\par EMG/NCV studies done in 2019 [place] of neuropathy  reported.\par Coming today for the similar symptoms.

## 2022-02-28 NOTE — REASON FOR VISIT
[Consultation] : a consultation visit [FreeTextEntry1] : EValuation for Burning pain under skin / used Creole

## 2022-02-28 NOTE — PHYSICAL EXAM
[General Appearance - Alert] : alert [Oriented To Time, Place, And Person] : oriented to person, place, and time [Person] : oriented to person [Place] : oriented to place [Time] : oriented to time [Short Term Intact] : short term memory intact [Fluency] : fluency intact [Current Events] : adequate knowledge of current events [Cranial Nerves Optic (II)] : visual acuity intact bilaterally,  visual fields full to confrontation, pupils equal round and reactive to light [Cranial Nerves Oculomotor (III)] : extraocular motion intact [Cranial Nerves Trigeminal (V)] : facial sensation intact symmetrically [Cranial Nerves Facial (VII)] : face symmetrical [Cranial Nerves Vestibulocochlear (VIII)] : hearing was intact bilaterally [Cranial Nerves Accessory (XI - Cranial And Spinal)] : head turning and shoulder shrug symmetric [Cranial Nerves Hypoglossal (XII)] : there was no tongue deviation with protrusion [Motor Strength] : muscle strength was normal in all four extremities [Sensation Tactile Decrease] : light touch was intact [Coordination - Dysmetria Impaired Finger-to-Nose Bilateral] : not present [2+] : Brachioradialis left 2+ [1+] : Ankle jerk left 1+ [FreeTextEntry5] : No white covering on tongue [Sclera] : the sclera and conjunctiva were normal [PERRL With Normal Accommodation] : pupils were equal in size, round, reactive to light, with normal accommodation [Extraocular Movements] : extraocular movements were intact [Outer Ear] : the ears and nose were normal in appearance [Oropharynx] : the oropharynx was normal [Neck Appearance] : the appearance of the neck was normal [Neck Cervical Mass (___cm)] : no neck mass was observed [Jugular Venous Distention Increased] : there was no jugular-venous distention [Thyroid Diffuse Enlargement] : the thyroid was not enlarged [Thyroid Nodule] : there were no palpable thyroid nodules [Auscultation Breath Sounds / Voice Sounds] : lungs were clear to auscultation bilaterally [Heart Rate And Rhythm] : heart rate was normal and rhythm regular [Heart Sounds] : normal S1 and S2 [Heart Sounds Gallop] : no gallops [Murmurs] : no murmurs [Heart Sounds Pericardial Friction Rub] : no pericardial rub [Full Pulse] : the pedal pulses are present [Edema] : there was no peripheral edema [Bowel Sounds] : normal bowel sounds [Abdomen Soft] : soft [Abdomen Tenderness] : non-tender [Abdomen Mass (___ Cm)] : no abdominal mass palpated [No CVA Tenderness] : no ~M costovertebral angle tenderness [No Spinal Tenderness] : no spinal tenderness [Abnormal Walk] : normal gait [Nail Clubbing] : no clubbing  or cyanosis of the fingernails [Musculoskeletal - Swelling] : no joint swelling seen [Motor Tone] : muscle strength and tone were normal [Skin Color & Pigmentation] : normal skin color and pigmentation [Skin Turgor] : normal skin turgor [] : no rash

## 2022-02-28 NOTE — REVIEW OF SYSTEMS
[Numbness] : no numbness [Abnormal Sensation] : an abnormal sensation [Hypersensitivity] : hypersensitivity [Negative] : Heme/Lymph

## 2022-02-28 NOTE — DISCUSSION/SUMMARY
[FreeTextEntry1] : She is 51-year-old patient coming here for evaluation for burning sensation involving both upper and lower extremities with nonfocal examination with a negative workup in the past consistent with possible sensory neuropathy.\par  Recommend increasing the dose of gabapentin to 200 mg at bedtime and titrate the dose to 300 if tolerated.\par  Repeat EMG/NCV studies the patient willing.\par Patient education provided discussed with the patient and her language through the  followup evaluation included 3 months after the EMG is done.\par Followup with you for other medical problems.\par \par \par \par

## 2022-05-25 ENCOUNTER — APPOINTMENT (OUTPATIENT)
Dept: NEUROLOGY | Facility: CLINIC | Age: 52
End: 2022-05-25
Payer: COMMERCIAL

## 2022-05-25 PROCEDURE — 95885 MUSC TST DONE W/NERV TST LIM: CPT

## 2022-05-25 PROCEDURE — 95912 NRV CNDJ TEST 11-12 STUDIES: CPT

## 2022-05-25 PROCEDURE — 99213 OFFICE O/P EST LOW 20 MIN: CPT | Mod: 25

## 2022-05-25 PROCEDURE — 99213 OFFICE O/P EST LOW 20 MIN: CPT

## 2022-05-25 NOTE — DISCUSSION/SUMMARY
[FreeTextEntry1] : 52-year-old woman with a history of paresthesias, numbness sensations of the body, presently by electrodiagnostic testing, no evidence of a diffuse neuropathy at this time.\par Questionable small neuropathy, in a prediabetic.\par Plan:Encourage the use of vitamin  B complex once a day.\par discuss the importance of exercise, and good glucose control.\par reevaluate in 6 months.

## 2022-05-25 NOTE — PHYSICAL EXAM
[General Appearance - Alert] : alert [General Appearance - In No Acute Distress] : in no acute distress [Oriented To Time, Place, And Person] : oriented to person, place, and time [Impaired Insight] : insight and judgment were intact [Affect] : the affect was normal [Person] : oriented to person [Place] : oriented to place [Time] : oriented to time [Visual Intact] : visual attention was ~T not ~L decreased [Fluency] : fluency intact [Comprehension] : comprehension intact [Past History] : adequate knowledge of personal past history [Cranial Nerves Optic (II)] : visual acuity intact bilaterally,  visual fields full to confrontation, pupils equal round and reactive to light [Cranial Nerves Oculomotor (III)] : extraocular motion intact [Cranial Nerves Trigeminal (V)] : facial sensation intact symmetrically [Cranial Nerves Facial (VII)] : face symmetrical [Cranial Nerves Vestibulocochlear (VIII)] : hearing was intact bilaterally [Cranial Nerves Glossopharyngeal (IX)] : tongue and palate midline [Cranial Nerves Accessory (XI - Cranial And Spinal)] : head turning and shoulder shrug symmetric [Cranial Nerves Hypoglossal (XII)] : there was no tongue deviation with protrusion [Motor Tone] : muscle tone was normal in all four extremities [Motor Strength] : muscle strength was normal in all four extremities [No Muscle Atrophy] : normal bulk in all four extremities [Motor Handedness Right-Handed] : the patient is right hand dominant [Paresis Pronator Drift Right-Sided] : no pronator drift on the right [Paresis Pronator Drift Left-Sided] : no pronator drift on the left [Sensation Tactile Decrease] : light touch was intact [Abnormal Walk] : normal gait [Balance] : balance was intact [Past-pointing] : there was no past-pointing [Tremor] : no tremor present [Dysdiadochokinesia Bilaterally] : not present [Coordination - Dysmetria Impaired Finger-to-Nose Bilateral] : not present [1+] : Ankle jerk left 1+

## 2022-05-25 NOTE — HISTORY OF PRESENT ILLNESS
[FreeTextEntry1] : 52-year-old woman with a history of intermittent complaints of numbness or tingling sensations in the hands and feet, sometimes the whole body. Usually more noticeable at rest, not associated with any particular physical activity.Denies any neck or low back pain.No weakness, no muscle cramps, no fasciculations noted.\par previously dilated roughly 3 years ago,with laboratory work, there was evidence of elevated hemoglobin A1c, and elevated blood glucose, which the family and her reports have been controlled with diet.\par In 2019, and electromyography nerve conduction study, with the right arm and leg, demonstrated mild right sensory carpal tunnel, otherwise no evidence of neuropathy. More recently, a repeat nerve conduction tomography study of the right arm, and both legs, again shows a mild right carpal tunnel, but no evidence of a neuropathy.\par

## 2022-06-27 NOTE — ED ADULT NURSE NOTE - NS ED NURSE RECORD ANOTHER VITAL SIGN
Problem: Pressure Injury - Risk of  Goal: *Prevention of pressure injury  Description: Document Bin Scale and appropriate interventions in the flowsheet.   6/27/2022 1411 by Ariana Garcia RN  Outcome: Resolved/Met  6/27/2022 1000 by Ariana Garcia RN  Outcome: Progressing Towards Goal  Note: Pressure Injury Interventions:  Sensory Interventions: Keep linens dry and wrinkle-free    Moisture Interventions: Absorbent underpads    Activity Interventions: PT/OT evaluation    Mobility Interventions: Pressure redistribution bed/mattress (bed type)    Nutrition Interventions: Document food/fluid/supplement intake    Friction and Shear Interventions: Minimize layers                Problem: Patient Education: Go to Patient Education Activity  Goal: Patient/Family Education  6/27/2022 1411 by Ariana Garcia RN  Outcome: Resolved/Met  6/27/2022 1000 by Ariana Garcia RN  Outcome: Progressing Towards Goal     Problem: Patient Education: Go to Patient Education Activity  Goal: Patient/Family Education  Outcome: Resolved/Met     Problem: TIA/CVA Stroke: 0-24 hours  Goal: Off Pathway (Use only if patient is Off Pathway)  Outcome: Resolved/Met  Goal: Activity/Safety  Outcome: Resolved/Met  Goal: Consults, if ordered  Outcome: Resolved/Met  Goal: Diagnostic Test/Procedures  Outcome: Resolved/Met  Goal: Nutrition/Diet  Outcome: Resolved/Met  Goal: Discharge Planning  Outcome: Resolved/Met  Goal: Medications  Outcome: Resolved/Met  Goal: Respiratory  Outcome: Resolved/Met  Goal: Treatments/Interventions/Procedures  Outcome: Resolved/Met  Goal: Minimize risk of bleeding post-thrombolytic infusion  Outcome: Resolved/Met  Goal: Monitor for complications post-thrombolytic infusion  Outcome: Resolved/Met  Goal: Psychosocial  Outcome: Resolved/Met  Goal: *Hemodynamically stable  Outcome: Resolved/Met  Goal: *Neurologically stable  Description: Absence of additional neurological deficits    Outcome: Resolved/Met  Goal: *Verbalizes anxiety and depression are reduced or absent  Outcome: Resolved/Met  Goal: *Absence of Signs of Aspiration on Current Diet  Outcome: Resolved/Met  Goal: *Absence of deep venous thrombosis signs and symptoms(Stroke Metric)  Outcome: Resolved/Met  Goal: *Ability to perform ADLs and demonstrates progressive mobility and function  Outcome: Resolved/Met  Goal: *Stroke education started(Stroke Metric)  Outcome: Resolved/Met  Goal: *Dysphagia screen performed(Stroke Metric)  Outcome: Resolved/Met  Goal: *Rehab consulted(Stroke Metric)  Outcome: Resolved/Met     Problem: TIA/CVA Stroke: Day 2 Until Discharge  Goal: Off Pathway (Use only if patient is Off Pathway)  Outcome: Resolved/Met  Goal: Activity/Safety  Outcome: Resolved/Met  Goal: Diagnostic Test/Procedures  Outcome: Resolved/Met  Goal: Nutrition/Diet  Outcome: Resolved/Met  Goal: Discharge Planning  Outcome: Resolved/Met  Goal: Medications  Outcome: Resolved/Met  Goal: Respiratory  Outcome: Resolved/Met  Goal: Treatments/Interventions/Procedures  Outcome: Resolved/Met  Goal: Psychosocial  Outcome: Resolved/Met  Goal: *Verbalizes anxiety and depression are reduced or absent  Outcome: Resolved/Met  Goal: *Absence of aspiration  Outcome: Resolved/Met  Goal: *Absence of deep venous thrombosis signs and symptoms(Stroke Metric)  Outcome: Resolved/Met  Goal: *Optimal pain control at patient's stated goal  Outcome: Resolved/Met  Goal: *Tolerating diet  Outcome: Resolved/Met  Goal: *Ability to perform ADLs and demonstrates progressive mobility and function  Outcome: Resolved/Met  Goal: *Stroke education continued(Stroke Metric)  Outcome: Resolved/Met     Problem: Ischemic Stroke: Discharge Outcomes  Goal: *Verbalizes anxiety and depression are reduced or absent  Outcome: Resolved/Met  Goal: *Verbalize understanding of risk factor modification(Stroke Metric)  Outcome: Resolved/Met  Goal: *Hemodynamically stable  Outcome: Resolved/Met  Goal: *Absence of aspiration pneumonia  Outcome: Resolved/Met  Goal: *Aware of needed dietary changes  Outcome: Resolved/Met  Goal: *Verbalize understanding of prescribed medications including anti-coagulants, anti-lipid, and/or anti-platelets(Stroke Metric)  Outcome: Resolved/Met  Goal: *Tolerating diet  Outcome: Resolved/Met  Goal: *Aware of follow-up diagnostics related to anticoagulants  Outcome: Resolved/Met  Goal: *Ability to perform ADLs and demonstrates progressive mobility and function  Outcome: Resolved/Met  Goal: *Absence of DVT(Stroke Metric)  Outcome: Resolved/Met  Goal: *Absence of aspiration  Outcome: Resolved/Met  Goal: *Optimal pain control at patient's stated goal  Outcome: Resolved/Met  Goal: *Home safety concerns addressed  Outcome: Resolved/Met  Goal: *Describes available resources and support systems  Outcome: Resolved/Met  Goal: *Verbalizes understanding of activation of EMS(911) for stroke symptoms(Stroke Metric)  Outcome: Resolved/Met  Goal: *Understands and describes signs and symptoms to report to providers(Stroke Metric)  Outcome: Resolved/Met  Goal: *Neurolgocially stable (absence of additional neurological deficits)  Outcome: Resolved/Met  Goal: *Verbalizes importance of follow-up with primary care physician(Stroke Metric)  Outcome: Resolved/Met  Goal: *Smoking cessation discussed,if applicable(Stroke Metric)  Outcome: Resolved/Met  Goal: *Depression screening completed(Stroke Metric)  Outcome: Resolved/Met     Problem: Falls - Risk of  Goal: *Absence of Falls  Description: Document Pam Fall Risk and appropriate interventions in the flowsheet.   Outcome: Resolved/Met     Problem: Patient Education: Go to Patient Education Activity  Goal: Patient/Family Education  Outcome: Resolved/Met     Problem: Patient Education: Go to Patient Education Activity  Goal: Patient/Family Education  Outcome: Resolved/Met     Problem: Patient Education: Go to Patient Education Activity  Goal: Patient/Family Education  Outcome: Resolved/Met     Problem: Nutrition Deficit  Goal: *Optimize nutritional status  Outcome: Resolved/Met Yes

## 2022-07-01 ENCOUNTER — APPOINTMENT (OUTPATIENT)
Dept: MAMMOGRAPHY | Facility: CLINIC | Age: 52
End: 2022-07-01

## 2022-07-01 ENCOUNTER — APPOINTMENT (OUTPATIENT)
Dept: ULTRASOUND IMAGING | Facility: CLINIC | Age: 52
End: 2022-07-01

## 2022-07-21 ENCOUNTER — APPOINTMENT (OUTPATIENT)
Dept: GASTROENTEROLOGY | Facility: CLINIC | Age: 52
End: 2022-07-21

## 2022-07-21 DIAGNOSIS — K62.5 HEMORRHAGE OF ANUS AND RECTUM: ICD-10-CM

## 2022-07-21 PROCEDURE — 99442: CPT

## 2022-08-04 ENCOUNTER — APPOINTMENT (OUTPATIENT)
Dept: MAMMOGRAPHY | Facility: CLINIC | Age: 52
End: 2022-08-04

## 2022-08-04 ENCOUNTER — OUTPATIENT (OUTPATIENT)
Dept: OUTPATIENT SERVICES | Facility: HOSPITAL | Age: 52
LOS: 1 days | End: 2022-08-04
Payer: COMMERCIAL

## 2022-08-04 ENCOUNTER — APPOINTMENT (OUTPATIENT)
Dept: ULTRASOUND IMAGING | Facility: CLINIC | Age: 52
End: 2022-08-04

## 2022-08-04 DIAGNOSIS — R92.8 OTHER ABNORMAL AND INCONCLUSIVE FINDINGS ON DIAGNOSTIC IMAGING OF BREAST: ICD-10-CM

## 2022-08-04 PROCEDURE — 76641 ULTRASOUND BREAST COMPLETE: CPT | Mod: 26,50

## 2022-08-04 PROCEDURE — 77067 SCR MAMMO BI INCL CAD: CPT | Mod: 26

## 2022-08-04 PROCEDURE — 77063 BREAST TOMOSYNTHESIS BI: CPT | Mod: 26

## 2022-08-04 PROCEDURE — 76641 ULTRASOUND BREAST COMPLETE: CPT

## 2022-08-04 PROCEDURE — 77067 SCR MAMMO BI INCL CAD: CPT

## 2022-08-04 PROCEDURE — 77063 BREAST TOMOSYNTHESIS BI: CPT

## 2022-09-20 ENCOUNTER — APPOINTMENT (OUTPATIENT)
Dept: GASTROENTEROLOGY | Facility: AMBULATORY MEDICAL SERVICES | Age: 52
End: 2022-09-20

## 2022-09-20 PROCEDURE — 45378 DIAGNOSTIC COLONOSCOPY: CPT | Mod: GC

## 2022-10-06 ENCOUNTER — APPOINTMENT (OUTPATIENT)
Dept: OTOLARYNGOLOGY | Facility: CLINIC | Age: 52
End: 2022-10-06

## 2022-10-06 VITALS
BODY MASS INDEX: 23.9 KG/M2 | SYSTOLIC BLOOD PRESSURE: 120 MMHG | WEIGHT: 140 LBS | TEMPERATURE: 98 F | HEART RATE: 85 BPM | DIASTOLIC BLOOD PRESSURE: 69 MMHG | HEIGHT: 64 IN

## 2022-10-06 DIAGNOSIS — Z80.42 FAMILY HISTORY OF MALIGNANT NEOPLASM OF PROSTATE: ICD-10-CM

## 2022-10-06 DIAGNOSIS — Z86.79 PERSONAL HISTORY OF OTHER DISEASES OF THE CIRCULATORY SYSTEM: ICD-10-CM

## 2022-10-06 DIAGNOSIS — E04.1 NONTOXIC SINGLE THYROID NODULE: ICD-10-CM

## 2022-10-06 DIAGNOSIS — K14.6 GLOSSODYNIA: ICD-10-CM

## 2022-10-06 PROCEDURE — 31575 DIAGNOSTIC LARYNGOSCOPY: CPT

## 2022-10-06 PROCEDURE — 99204 OFFICE O/P NEW MOD 45 MIN: CPT | Mod: 25

## 2022-10-06 RX ORDER — IRON/IRON ASP GLY/FA/MV-MIN 38 125-25-1MG
TABLET ORAL
Refills: 0 | Status: ACTIVE | COMMUNITY

## 2022-10-06 RX ORDER — AMLODIPINE BESYLATE 5 MG/1
5 TABLET ORAL
Refills: 0 | Status: COMPLETED | COMMUNITY
End: 2022-10-06

## 2022-10-06 RX ORDER — METRONIDAZOLE 500 MG/1
500 TABLET ORAL
Refills: 0 | Status: COMPLETED | COMMUNITY
End: 2022-10-06

## 2022-10-06 RX ORDER — ACETAMINOPHEN/DIPHENHYDRAMINE 500MG-25MG
TABLET ORAL
Refills: 0 | Status: ACTIVE | COMMUNITY

## 2022-10-06 RX ORDER — GABAPENTIN 100 MG/1
100 CAPSULE ORAL
Refills: 0 | Status: COMPLETED | COMMUNITY
End: 2022-10-06

## 2022-10-06 RX ORDER — AMLODIPINE BESYLATE AND OLMESARTAN MEDOXOMIL 10; 40 MG/1; MG/1
TABLET, FILM COATED ORAL
Refills: 0 | Status: ACTIVE | COMMUNITY

## 2022-10-06 RX ORDER — ASCORBIC ACID 500 MG
TABLET ORAL
Refills: 0 | Status: ACTIVE | COMMUNITY

## 2022-10-06 RX ORDER — SODIUM PICOSULFATE, MAGNESIUM OXIDE, AND ANHYDROUS CITRIC ACID 10; 3.5; 12 MG/160ML; G/160ML; G/160ML
10-3.5-12 MG-GM LIQUID ORAL
Qty: 2 | Refills: 0 | Status: COMPLETED | COMMUNITY
Start: 2022-07-21 | End: 2022-10-06

## 2022-10-06 NOTE — HISTORY OF PRESENT ILLNESS
[de-identified] : 52 year old female referred by Dr Philip for tongue discomfort. States for the last 3 years has been having issues with her tongue, her tongue becomes very hot, causes right facial cramping, at times mostly on her right cheek at times the whole right side of face, right ear feels very full,  tongue hurts to move, at times has night sweats, chills, has to place pressure on her face and usually resolves on its own after 2 hours. Patient denies dysphagia, odynophagia, dyspnea, dysphonia, fevers, otalgia, or hot or cold food sensitivity.

## 2022-10-06 NOTE — PHYSICAL EXAM
[Midline] : trachea located in midline position [Laryngoscopy Performed] : laryngoscopy was performed, see procedure section for findings [Normal] : no rashes [de-identified] : R thyroid mass/nodule

## 2022-10-06 NOTE — ASSESSMENT
[FreeTextEntry1] : Pt has no findings to explain her burning tongue and throat.   She is already taking MVI with Zinc supplement.  Pt to consider an evaluation by Oral Path team.

## 2022-10-06 NOTE — CONSULT LETTER
[Dear  ___] : Dear  [unfilled], [Consult Letter:] : I had the pleasure of evaluating your patient, [unfilled]. [Please see my note below.] : Please see my note below. [Consult Closing:] : Thank you very much for allowing me to participate in the care of this patient.  If you have any questions, please do not hesitate to contact me. [Sincerely,] : Sincerely, [FreeTextEntry2] : Babs Philip MD [FreeTextEntry3] : Horace Roberson MD, FACS\par Chief of Otolaryngology and Head & Neck Surgery\par [facility]\par  - Dept. of Otolaryngology\par [employer]\par \par

## 2022-10-06 NOTE — PROCEDURE
[Unable to Cooperate with Mirror] : patient unable to cooperate with mirror [Topical Lidocaine] : topical lidocaine [Oxymetazoline HCl] : oxymetazoline HCl [Normal] : posterior cricoid area had healthy pink mucosa in the interarytenoid area and the esophageal inlet [Flexible Endoscope] : examined with the flexible endoscope [Serial Number: ___] : Serial Number: [unfilled] [True Vocal Cords Paralysis] : no true vocal cord paralysis [True Vocal Cords Erythematous] : no true vocal cord edema [True Vocal Cords Gray's Nodules] : no true vocal cord nodules [Glottis Arytenoid Cartilages] : no arytenoid granulomas [Glottis Arytenoid Cartilages Erythema] : no arytenoid erythema [de-identified] : thyroid mass and throat burning

## 2022-10-06 NOTE — REASON FOR VISIT
[Initial Consultation] : an initial consultation for [Family Member] : family member [FreeTextEntry2] : tongue discomfort

## 2022-10-07 ENCOUNTER — NON-APPOINTMENT (OUTPATIENT)
Age: 52
End: 2022-10-07

## 2022-10-17 LAB
THYROGLOB AB SERPL-ACNC: <20 IU/ML
THYROPEROXIDASE AB SERPL IA-ACNC: <10 IU/ML
TSH SERPL-ACNC: 0.92 UIU/ML

## 2022-10-18 ENCOUNTER — APPOINTMENT (OUTPATIENT)
Dept: ULTRASOUND IMAGING | Facility: CLINIC | Age: 52
End: 2022-10-18

## 2022-10-21 ENCOUNTER — NON-APPOINTMENT (OUTPATIENT)
Age: 52
End: 2022-10-21

## 2022-10-21 ENCOUNTER — APPOINTMENT (OUTPATIENT)
Dept: ULTRASOUND IMAGING | Facility: CLINIC | Age: 52
End: 2022-10-21

## 2022-10-21 ENCOUNTER — OUTPATIENT (OUTPATIENT)
Dept: OUTPATIENT SERVICES | Facility: HOSPITAL | Age: 52
LOS: 1 days | End: 2022-10-21
Payer: COMMERCIAL

## 2022-10-21 DIAGNOSIS — E04.1 NONTOXIC SINGLE THYROID NODULE: ICD-10-CM

## 2022-10-21 PROCEDURE — 76536 US EXAM OF HEAD AND NECK: CPT

## 2022-10-21 PROCEDURE — 76536 US EXAM OF HEAD AND NECK: CPT | Mod: 26

## 2022-11-04 ENCOUNTER — NON-APPOINTMENT (OUTPATIENT)
Age: 52
End: 2022-11-04

## 2022-11-04 DIAGNOSIS — E04.2 NONTOXIC MULTINODULAR GOITER: ICD-10-CM

## 2022-11-17 ENCOUNTER — APPOINTMENT (OUTPATIENT)
Dept: GASTROENTEROLOGY | Facility: CLINIC | Age: 52
End: 2022-11-17

## 2022-11-17 VITALS
DIASTOLIC BLOOD PRESSURE: 80 MMHG | BODY MASS INDEX: 19.63 KG/M2 | SYSTOLIC BLOOD PRESSURE: 120 MMHG | WEIGHT: 115 LBS | HEIGHT: 64 IN | HEART RATE: 87 BPM

## 2022-11-17 DIAGNOSIS — R19.8 OTHER SPECIFIED SYMPTOMS AND SIGNS INVOLVING THE DIGESTIVE SYSTEM AND ABDOMEN: ICD-10-CM

## 2022-11-17 PROCEDURE — 99213 OFFICE O/P EST LOW 20 MIN: CPT

## 2022-11-17 NOTE — ASSESSMENT
[FreeTextEntry1] : 52 F presenting for eval of post prandial epigastric pain fr the last month. Likely gastritis. Will start PPI daily. Did note occasional dark stool, and is on iron supplementation. Will obtain labs to assess for anemia. Educated on acid reducing diet. Should follow up in 1 month. All questions answered. Understanding verbalized. Discussed with Dr. Calero.

## 2022-11-17 NOTE — REVIEW OF SYSTEMS
[As Noted in HPI] : as noted in HPI [Abdominal Pain] : abdominal pain [Constipation] : constipation [Heartburn] : heartburn [Negative] : Respiratory [Vomiting] : no vomiting [Diarrhea] : no diarrhea [Melena (black stool)] : no melena [Bleeding] : no bleeding [Fecal Incontinence (soiling)] : no fecal incontinence [Bloating (gassiness)] : no bloating

## 2022-11-18 LAB
ALBUMIN SERPL ELPH-MCNC: 5 G/DL
ALP BLD-CCNC: 75 U/L
ALT SERPL-CCNC: 14 U/L
ANION GAP SERPL CALC-SCNC: 12 MMOL/L
AST SERPL-CCNC: 17 U/L
BASOPHILS # BLD AUTO: 0.03 K/UL
BASOPHILS NFR BLD AUTO: 0.7 %
BILIRUB SERPL-MCNC: 0.3 MG/DL
BUN SERPL-MCNC: 17 MG/DL
CALCIUM SERPL-MCNC: 10 MG/DL
CHLORIDE SERPL-SCNC: 102 MMOL/L
CO2 SERPL-SCNC: 26 MMOL/L
CREAT SERPL-MCNC: 0.88 MG/DL
EGFR: 79 ML/MIN/1.73M2
EOSINOPHIL # BLD AUTO: 0.02 K/UL
EOSINOPHIL NFR BLD AUTO: 0.4 %
GLUCOSE SERPL-MCNC: 72 MG/DL
HCT VFR BLD CALC: 38.2 %
HGB BLD-MCNC: 11.9 G/DL
IMM GRANULOCYTES NFR BLD AUTO: 0 %
LYMPHOCYTES # BLD AUTO: 2.42 K/UL
LYMPHOCYTES NFR BLD AUTO: 53 %
MAN DIFF?: NORMAL
MCHC RBC-ENTMCNC: 26.8 PG
MCHC RBC-ENTMCNC: 31.2 GM/DL
MCV RBC AUTO: 86 FL
MONOCYTES # BLD AUTO: 0.3 K/UL
MONOCYTES NFR BLD AUTO: 6.6 %
NEUTROPHILS # BLD AUTO: 1.8 K/UL
NEUTROPHILS NFR BLD AUTO: 39.3 %
PLATELET # BLD AUTO: 230 K/UL
POTASSIUM SERPL-SCNC: 5.8 MMOL/L
PROT SERPL-MCNC: 7.7 G/DL
RBC # BLD: 4.44 M/UL
RBC # FLD: 13.2 %
SODIUM SERPL-SCNC: 140 MMOL/L
WBC # FLD AUTO: 4.57 K/UL

## 2022-11-28 ENCOUNTER — RESULT REVIEW (OUTPATIENT)
Age: 52
End: 2022-11-28

## 2022-12-14 ENCOUNTER — RESULT REVIEW (OUTPATIENT)
Age: 52
End: 2022-12-14

## 2022-12-14 ENCOUNTER — OUTPATIENT (OUTPATIENT)
Dept: OUTPATIENT SERVICES | Facility: HOSPITAL | Age: 52
LOS: 1 days | End: 2022-12-14
Payer: COMMERCIAL

## 2022-12-14 ENCOUNTER — APPOINTMENT (OUTPATIENT)
Dept: ULTRASOUND IMAGING | Facility: CLINIC | Age: 52
End: 2022-12-14

## 2022-12-14 DIAGNOSIS — E04.2 NONTOXIC MULTINODULAR GOITER: ICD-10-CM

## 2022-12-14 PROCEDURE — 10005 FNA BX W/US GDN 1ST LES: CPT

## 2022-12-14 PROCEDURE — 10006 FNA BX W/US GDN EA ADDL: CPT

## 2022-12-20 NOTE — CONSULT LETTER
[Dear  ___] : Dear  [unfilled], [( Thank you for referring [unfilled] for consultation for _____ )] : Thank you for referring [unfilled] for consultation for [unfilled] [Please see my note below.] : Please see my note below. [Consult Closing:] : Thank you very much for allowing me to participate in the care of this patient.  If you have any questions, please do not hesitate to contact me. [Sincerely,] : Sincerely, [FreeTextEntry2] : Dr Roma Alvarez [FreeTextEntry3] : Danyelle Cho  Humira Counseling:  I discussed with the patient the risks of adalimumab including but not limited to myelosuppression, immunosuppression, autoimmune hepatitis, demyelinating diseases, lymphoma, and serious infections.  The patient understands that monitoring is required including a PPD at baseline and must alert us or the primary physician if symptoms of infection or other concerning signs are noted.

## 2023-01-27 ENCOUNTER — NON-APPOINTMENT (OUTPATIENT)
Age: 53
End: 2023-01-27

## 2023-02-14 NOTE — ED ADULT TRIAGE NOTE - PAIN: PRESENCE, MLM
[Normal] : well developed, well nourished, in no acute distress [Obese, well nourished, in no acute distress] : obese, well nourished, in no acute distress complains of pain/discomfort

## 2023-02-24 ENCOUNTER — APPOINTMENT (OUTPATIENT)
Dept: GASTROENTEROLOGY | Facility: CLINIC | Age: 53
End: 2023-02-24
Payer: COMMERCIAL

## 2023-02-24 VITALS
SYSTOLIC BLOOD PRESSURE: 144 MMHG | HEIGHT: 64 IN | DIASTOLIC BLOOD PRESSURE: 87 MMHG | BODY MASS INDEX: 19.97 KG/M2 | WEIGHT: 117 LBS | HEART RATE: 93 BPM

## 2023-02-24 PROCEDURE — 99214 OFFICE O/P EST MOD 30 MIN: CPT

## 2023-02-24 RX ORDER — SUCRALFATE 1 G/10ML
1 SUSPENSION ORAL
Qty: 400 | Refills: 4 | Status: ACTIVE | COMMUNITY
Start: 2023-02-24 | End: 1900-01-01

## 2023-02-25 NOTE — ASSESSMENT
[FreeTextEntry1] : 53yo female with epigastric pain, gerd \par \par change ppi to dexlansoprazole\par readd carafate\par \par check egd\par Risks and benefits of procedure(s) discussed with patient in detail, including but not limited to, perforation, bleeding, reaction to anesthesia, missed lesions.\par \par \par to see ent

## 2023-02-25 NOTE — HISTORY OF PRESENT ILLNESS
[FreeTextEntry1] : 51yo female with gerd\par \par She has some pain on left side jaw and face\par She is also with refractory gerd and epigastric pain\par She  also has epigastric discomfort, can be exacerbated by eating\par egd with gastritis and stricture in the past nonw in some time

## 2023-02-25 NOTE — PHYSICAL EXAM

## 2023-03-10 RX ORDER — SUCRALFATE 1 G/1
1 TABLET ORAL 4 TIMES DAILY
Qty: 120 | Refills: 0 | Status: ACTIVE | COMMUNITY
Start: 2023-03-02 | End: 1900-01-01

## 2023-03-14 ENCOUNTER — APPOINTMENT (OUTPATIENT)
Dept: OTOLARYNGOLOGY | Facility: CLINIC | Age: 53
End: 2023-03-14
Payer: COMMERCIAL

## 2023-03-14 VITALS — TEMPERATURE: 97.2 F | HEIGHT: 62 IN | WEIGHT: 117 LBS | BODY MASS INDEX: 21.53 KG/M2

## 2023-03-14 DIAGNOSIS — R09.89 OTHER SPECIFIED SYMPTOMS AND SIGNS INVOLVING THE CIRCULATORY AND RESPIRATORY SYSTEMS: ICD-10-CM

## 2023-03-14 DIAGNOSIS — R20.8 OTHER DISTURBANCES OF SKIN SENSATION: ICD-10-CM

## 2023-03-14 DIAGNOSIS — H92.01 OTALGIA, RIGHT EAR: ICD-10-CM

## 2023-03-14 PROCEDURE — 31575 DIAGNOSTIC LARYNGOSCOPY: CPT

## 2023-03-14 PROCEDURE — 99214 OFFICE O/P EST MOD 30 MIN: CPT | Mod: 25

## 2023-03-14 RX ORDER — NYSTATIN 100000 [USP'U]/ML
100000 SUSPENSION ORAL 3 TIMES DAILY
Qty: 100 | Refills: 2 | Status: ACTIVE | COMMUNITY
Start: 2023-03-14 | End: 1900-01-01

## 2023-03-14 NOTE — REASON FOR VISIT
[Subsequent Evaluation] : a subsequent evaluation for [Ear Pain] : ear pain [[employer] ] : provided by [employer]   [Time Spent: ____ minutes] : Total time spent using  services: [unfilled] minutes. The patient's primary language is not English thus required  services. [Source: ______] : History obtained from [unfilled] [FreeTextEntry2] : ears [Interpreters_IDNumber] : [id number] [Interpreters_FullName] : [email or web address] [TWNoteComboBox1] : Trace

## 2023-03-14 NOTE — ASSESSMENT
[FreeTextEntry1] : cbc wnl wbc wnl, tfs wnl\par exam today unremarkable\par unclear etiolgy for throat symptoms\par ear exam unremarkable\par trial nystatin swish and swallow

## 2023-03-14 NOTE — HISTORY OF PRESENT ILLNESS
[de-identified] : co burning tongue and throat\par co ear pain no hearing loss\par some oral dryness\par has had trial of omeprazole and another ppi without improvement

## 2023-03-22 ENCOUNTER — APPOINTMENT (OUTPATIENT)
Dept: GASTROENTEROLOGY | Facility: AMBULATORY MEDICAL SERVICES | Age: 53
End: 2023-03-22
Payer: COMMERCIAL

## 2023-03-22 ENCOUNTER — RESULT REVIEW (OUTPATIENT)
Age: 53
End: 2023-03-22

## 2023-03-22 PROCEDURE — 43239 EGD BIOPSY SINGLE/MULTIPLE: CPT | Mod: GC

## 2023-03-24 ENCOUNTER — APPOINTMENT (OUTPATIENT)
Dept: GASTROENTEROLOGY | Facility: CLINIC | Age: 53
End: 2023-03-24
Payer: COMMERCIAL

## 2023-03-24 VITALS
WEIGHT: 118 LBS | HEART RATE: 90 BPM | DIASTOLIC BLOOD PRESSURE: 74 MMHG | SYSTOLIC BLOOD PRESSURE: 114 MMHG | HEIGHT: 62 IN | BODY MASS INDEX: 21.71 KG/M2

## 2023-03-24 DIAGNOSIS — K21.9 GASTRO-ESOPHAGEAL REFLUX DISEASE W/OUT ESOPHAGITIS: ICD-10-CM

## 2023-03-24 DIAGNOSIS — R07.0 PAIN IN THROAT: ICD-10-CM

## 2023-03-24 DIAGNOSIS — R10.13 EPIGASTRIC PAIN: ICD-10-CM

## 2023-03-24 PROCEDURE — 99213 OFFICE O/P EST LOW 20 MIN: CPT

## 2023-03-24 NOTE — REVIEW OF SYSTEMS
[Sore Throat] : sore throat [As Noted in HPI] : as noted in HPI [Heartburn] : heartburn [Negative] : Psychiatric

## 2023-03-26 NOTE — ASSESSMENT
[FreeTextEntry1] : 52 F presenting for follow up s/p EGD. Had EGD 2 days ago for epigastric pain which showed mild inflammation. She has had improvement of epigastric pain/burning with PPI and carafate. Should continue with this for now. Educated on acid reducing diet. Should follow up 6-8 weeks. Discussed with Dr. Calero. \par \par \par

## 2023-04-05 RX ORDER — DEXLANSOPRAZOLE 60 MG/1
60 CAPSULE, DELAYED RELEASE ORAL
Qty: 90 | Refills: 3 | Status: DISCONTINUED | COMMUNITY
Start: 2023-02-24 | End: 2023-04-05

## 2023-04-05 RX ORDER — OMEPRAZOLE 40 MG/1
40 CAPSULE, DELAYED RELEASE ORAL
Qty: 30 | Refills: 1 | Status: DISCONTINUED | COMMUNITY
Start: 2022-11-17 | End: 2023-04-05

## 2023-04-28 ENCOUNTER — NON-APPOINTMENT (OUTPATIENT)
Age: 53
End: 2023-04-28

## 2023-05-04 ENCOUNTER — OUTPATIENT (OUTPATIENT)
Dept: OUTPATIENT SERVICES | Facility: HOSPITAL | Age: 53
LOS: 1 days | End: 2023-05-04
Payer: COMMERCIAL

## 2023-05-04 ENCOUNTER — APPOINTMENT (OUTPATIENT)
Dept: ULTRASOUND IMAGING | Facility: CLINIC | Age: 53
End: 2023-05-04
Payer: COMMERCIAL

## 2023-05-04 DIAGNOSIS — K76.0 FATTY (CHANGE OF) LIVER, NOT ELSEWHERE CLASSIFIED: ICD-10-CM

## 2023-05-04 PROCEDURE — 76705 ECHO EXAM OF ABDOMEN: CPT

## 2023-05-04 PROCEDURE — 76705 ECHO EXAM OF ABDOMEN: CPT | Mod: 26

## 2023-05-05 ENCOUNTER — APPOINTMENT (OUTPATIENT)
Dept: GASTROENTEROLOGY | Facility: CLINIC | Age: 53
End: 2023-05-05

## 2023-06-23 ENCOUNTER — RX CHANGE (OUTPATIENT)
Age: 53
End: 2023-06-23

## 2023-06-23 RX ORDER — PANTOPRAZOLE 40 MG/1
40 TABLET, DELAYED RELEASE ORAL
Qty: 180 | Refills: 2 | Status: ACTIVE | COMMUNITY
Start: 2023-04-05

## 2023-08-29 ENCOUNTER — APPOINTMENT (OUTPATIENT)
Dept: COLORECTAL SURGERY | Facility: CLINIC | Age: 53
End: 2023-08-29

## 2023-09-27 ENCOUNTER — APPOINTMENT (OUTPATIENT)
Dept: RHEUMATOLOGY | Facility: CLINIC | Age: 53
End: 2023-09-27

## 2023-10-18 ENCOUNTER — APPOINTMENT (OUTPATIENT)
Dept: RHEUMATOLOGY | Facility: CLINIC | Age: 53
End: 2023-10-18

## 2023-11-02 NOTE — ASSESSMENT
[FreeTextEntry1] : 51 yo female with history of globus sensation, improving; abnormal CT scan. Will obtain MRI to evaluate finding.
11

## 2023-11-06 ENCOUNTER — EMERGENCY (EMERGENCY)
Facility: HOSPITAL | Age: 53
LOS: 0 days | Discharge: HOME | End: 2023-11-06
Attending: EMERGENCY MEDICINE
Payer: COMMERCIAL

## 2023-11-06 VITALS — HEIGHT: 65 IN | WEIGHT: 119.93 LBS

## 2023-11-06 VITALS
TEMPERATURE: 98 F | RESPIRATION RATE: 18 BRPM | HEART RATE: 90 BPM | OXYGEN SATURATION: 97 % | DIASTOLIC BLOOD PRESSURE: 77 MMHG | SYSTOLIC BLOOD PRESSURE: 140 MMHG

## 2023-11-06 DIAGNOSIS — I10 ESSENTIAL (PRIMARY) HYPERTENSION: ICD-10-CM

## 2023-11-06 DIAGNOSIS — H66.92 OTITIS MEDIA, UNSPECIFIED, LEFT EAR: ICD-10-CM

## 2023-11-06 PROCEDURE — 99283 EMERGENCY DEPT VISIT LOW MDM: CPT

## 2023-11-06 RX ORDER — CEFDINIR 250 MG/5ML
1 POWDER, FOR SUSPENSION ORAL
Qty: 20 | Refills: 0
Start: 2023-11-06

## 2023-11-06 NOTE — ED ADULT TRIAGE NOTE - SOURCE OF INFORMATION

## 2023-11-06 NOTE — ED STATDOCS - PROGRESS NOTE DETAILS
PA note: All labwork/radiology results discussed in detail with patient. Patient re-examined and re-evaluated. Patient feels much better at this time. ED evaluation, Diagnosis and management discussed with the patient in detail. Workup results discussed with ED attending, OK to dc home. Close ENT follow up encouraged, aftercare to assist with scheduling appointment ASAP. Strict ED return instructions discussed in detail and patient given the opportunity to ask any questions about their discharge diagnosis and instructions. Patient verbalized understanding. ~ Amilcar Field PA-C PA: patient is a 52 y/o female with PMHx of HTN who presents to [facility] with son translating Creole c/o left ear fullness, ringing, muffled hearing in left ear. ~Amilcar Field PA-C

## 2023-11-06 NOTE — ED STATDOCS - PHYSICAL EXAMINATION
Gen: Well appearing in NAD  Head: NC/AT  Neck: Trachea midline  ENT: Left TM with hair in canal   Resp: No distress  Ext: No deformities  Neuro: A&O appears non focal  Skin: Warm and dry as visualized  Psych: Normal affect and mood Attending: Gen: Well appearing in NAD  Head: NC/AT  Neck: Trachea midline  ENT: Left TM with hair in canal   Resp: No distress  Ext: No deformities  Neuro: A&O appears non focal  Skin: Warm and dry as visualized  Psych: Normal affect and mood

## 2023-11-06 NOTE — ED STATDOCS - PATIENT PORTAL LINK FT
You can access the FollowMyHealth Patient Portal offered by [facility] by registering at the following website: [email or web address]. By joining [employer]’s FollowMyHealth portal, you will also be able to view your health information using other applications (apps) compatible with our system.

## 2023-11-06 NOTE — ED ADULT TRIAGE NOTE - CHIEF COMPLAINT QUOTE
Pt to ED c/o left ear hearing loss and pain started Friday. Denies discharge, fevers or respiratory issues. Pt creole speaking, Son translating.

## 2023-11-06 NOTE — ED STATDOCS - CLINICAL SUMMARY MEDICAL DECISION MAKING FREE TEXT BOX
Patient with report of left-sided hearing loss, hair in canal on exam, will irrigate and reevaluate, possible sudden sensorineural hearing loss consider steroids if irrigation does not work Patient with report of left-sided hearing loss, hair in canal on exam, will irrigate and reevaluate, possible sudden sensorineural hearing loss consider steroids if irrigation does not work    PA note: All labwork/radiology results discussed in detail with patient. Patient re-examined and re-evaluated. Patient feels much better at this time. ED evaluation, Diagnosis and management discussed with the patient in detail. Workup results discussed with ED attending, OK to dc home. Close ENT follow up encouraged, aftercare to assist with scheduling appointment ASAP. Strict ED return instructions discussed in detail and patient given the opportunity to ask any questions about their discharge diagnosis and instructions. Patient verbalized understanding. ~ Amilcar Field PA-C

## 2023-11-06 NOTE — ED STATDOCS - CARE PROVIDER_API CALL
Hernando Contreras.  Otolaryngology  [address] [place] [address], [address]  [place] [address]  Phone: [phone]  Fax: [phone]  Follow Up Time: Urgent

## 2023-11-06 NOTE — ED STATDOCS - NS ED ATTENDING STATEMENT MOD
This was a shared visit with the RAHAT. I reviewed and verified the documentation and independently performed the documented:

## 2023-11-06 NOTE — ED STATDOCS - NSFOLLOWUPINSTRUCTIONS_ED_ALL_ED_FT
Otitis Media, Adult  An ear, with close-ups of a normal ear and an ear filled with fluid.  Otitis media occurs when there is inflammation and fluid in the middle ear with signs and symptoms of an acute infection. The middle ear is a part of the ear that contains bones for hearing as well as air that helps send sounds to the brain. When infected fluid builds up in this space, it causes pressure and can lead to an ear infection. The eustachian tube connects the middle ear to the back of the nose (nasopharynx) and normally allows air into the middle ear. If the eustachian tube becomes blocked, fluid can build up and become infected.    What are the causes?  This condition is caused by a blockage in the eustachian tube. This can be caused by mucus or by swelling of the tube. Problems that can cause a blockage include:  A cold or other upper respiratory infection.  Allergies.  An irritant, such as tobacco smoke.  Enlarged adenoids. The adenoids are areas of soft tissue located high in the back of the throat, behind the nose and the roof of the mouth. They are part of the body's defense system (immune system).  A mass in the nasopharynx.  Damage to the ear caused by pressure changes (barotrauma).  What increases the risk?  You are more likely to develop this condition if you:  Smoke or are exposed to tobacco smoke.  Have an opening in the roof of your mouth (cleft palate).  Have gastroesophageal reflux.  Have an immune system disorder.  What are the signs or symptoms?  Symptoms of this condition include:  Ear pain.  Fever.  Decreased hearing.  Tiredness (lethargy).  Fluid leaking from the ear, if the eardrum is ruptured or has burst.  Ringing in the ear.  How is this diagnosed?  A health care provider checks a person's ear using an otoscope. A close-up of the ear and otoscope is also shown.  This condition is diagnosed with a physical exam. During the exam, your health care provider will use an instrument called an otoscope to look in your ear and check for redness, swelling, and fluid. He or she will also ask about your symptoms.    Your health care provider may also order tests, such as:  A pneumatic otoscopy. This is a test to check the movement of the eardrum. It is done by squeezing a small amount of air into the ear.  A tympanogram. This is a test that shows how well the eardrum moves in response to air pressure in the ear canal. It provides a graph for your health care provider to review.  How is this treated?  This condition can go away on its own within 3–5 days. But if the condition is caused by a bacterial infection and does not go away on its own, or if it keeps coming back, your health care provider may:  Prescribe antibiotic medicine to treat the infection.  Prescribe or recommend medicines to control pain.  Follow these instructions at home:  Take over-the-counter and prescription medicines only as told by your health care provider.  If you were prescribed an antibiotic medicine, take it as told by your health care provider. Do not stop taking the antibiotic even if you start to feel better.  Keep all follow-up visits. This is important.  Contact a health care provider if:  You have bleeding from your nose.  There is a lump on your neck.  You are not feeling better in 5 days.  You feel worse instead of better.  Get help right away if:  You have severe pain that is not controlled with medicine.  You have swelling, redness, or pain around your ear.  You have stiffness in your neck.  A part of your face is not moving (paralyzed).  The bone behind your ear (mastoid bone) is tender when you touch it.  You develop a severe headache.  Summary  Otitis media is redness, soreness, and swelling of the middle ear, usually resulting in pain and decreased hearing.  This condition can go away on its own within 3–5 days.  If the problem does not go away in 3–5 days, your health care provider may give you medicines to treat the infection.  If you were prescribed an antibiotic medicine, take it as told by your health care provider.  Follow all instructions that were given to you by your health care provider.  This information is not intended to replace advice given to you by your health care provider. Make sure you discuss any questions you have with your health care provider.      Tinnitus  Tinnitus refers to hearing a sound when there is no actual source for that sound. This is often described as ringing in the ears. However, people with this condition may hear a variety of noises, in one ear or in both ears.    The sounds of tinnitus can be soft, loud, or somewhere in between. Tinnitus can last for a few seconds or can be constant for days. It may go away without treatment and come back at various times. When tinnitus is constant or happens often, it can lead to other problems, such as trouble sleeping and trouble concentrating.    Almost everyone experiences tinnitus at some point. Tinnitus is not the same as hearing loss. Tinnitus that is long-lasting (chronic) or comes back often (recurs) may require medical attention.    What are the causes?  The cause of tinnitus is often not known. In some cases, it can result from:  Exposure to loud noises from machinery, music, or other sources.  An object (foreign body) stuck in the ear.  Earwax buildup.  Drinking alcohol or caffeine.  Taking certain medicines.  Age-related hearing loss.  It may also be caused by medical conditions such as:  Ear or sinus infections.  Heart diseases or high blood pressure.  Allergies.  Ménière's disease.  Thyroid problems.  Tumors.  A weak, bulging blood vessel (aneurysm) near the ear.  What increases the risk?  The following factors may make you more likely to develop this condition:  Exposure to loud noises.  Age. Tinnitus is more likely in older individuals.  Using alcohol or tobacco.  What are the signs or symptoms?  The main symptom of tinnitus is hearing a sound when there is no source for that sound. It may sound like:  Buzzing.  Sizzling.  Ringing.  Blowing air.  Hissing.  Whistling.  Other sounds may include:  Roaring.  Running water.  A musical note.  Tapping.  Humming.  Symptoms may affect only one ear (unilateral) or both ears (bilateral).    How is this diagnosed?  Tinnitus is diagnosed based on your symptoms, your medical history, and a physical exam. Your health care provider may do a thorough hearing test (audiologic exam) if your tinnitus:  Is unilateral.  Causes hearing difficulties.  Lasts 6 months or longer.  You may work with a health care provider who specializes in hearing disorders (audiologist). You may be asked questions about your symptoms and how they affect your daily life. You may have other tests done, such as:  CT scan.  MRI.  An imaging test of how blood flows through your blood vessels (angiogram).  How is this treated?  Treating an underlying medical condition can sometimes make tinnitus go away. If your tinnitus continues, other treatments may include:  Therapy and counseling to help you manage the stress of living with tinnitus.  Sound generators to mask the tinnitus. These include:  Tabletop sound machines that play relaxing sounds to help you fall asleep.  Wearable devices that fit in your ear and play sounds or music.  Acoustic neural stimulation. This involves using headphones to listen to music that contains an auditory signal. Over time, listening to this signal may change some pathways in your brain and make you less sensitive to tinnitus. This treatment is used for very severe cases when no other treatment is working.  Using hearing aids or cochlear implants if your tinnitus is related to hearing loss. Hearing aids are worn in the outer ear. Cochlear implants are surgically placed in the inner ear.  Follow these instructions at home:  Managing symptoms    Outline of person in bed with head of bed raised.  Person wearing headphones.  When possible, avoid being in loud places and being exposed to loud sounds.  Wear hearing protection, such as earplugs, when you are exposed to loud noises.  Use a white noise machine, a humidifier, or other devices to mask the sound of tinnitus.  Practice techniques for reducing stress, such as meditation, yoga, or deep breathing. Work with your health care provider if you need help with managing stress.  Sleep with your head slightly raised. This may reduce the impact of tinnitus.  General instructions    Do not use stimulants, such as nicotine, alcohol, or caffeine. Talk with your health care provider about other stimulants to avoid. Stimulants are substances that can make you feel alert and attentive by increasing certain activities in the body (such as heart rate and blood pressure). These substances may make tinnitus worse.  Take over-the-counter and prescription medicines only as told by your health care provider.  Try to get plenty of sleep each night.  Keep all follow-up visits. This is important.  Contact a health care provider if:  Your tinnitus continues for 3 weeks or longer without stopping.  You develop sudden hearing loss.  Your symptoms get worse or do not get better with home care.  You feel you are not able to manage the stress of living with tinnitus.  Get help right away if:  You develop tinnitus after a head injury.  You have tinnitus along with any of the following:  Dizziness.  Nausea and vomiting.  Loss of balance.  Sudden, severe headache.  Vision changes.  Facial weakness or weakness of arms or legs.  These symptoms may represent a serious problem that is an emergency. Do not wait to see if the symptoms will go away. Get medical help right away. Call your local emergency services (911 in the [place] Do not drive yourself to the hospital.    Summary  Tinnitus refers to hearing a sound when there is no actual source for that sound. This is often described as ringing in the ears.  Symptoms may affect only one ear (unilateral) or both ears (bilateral).  Use a white noise machine, a humidifier, or other devices to mask the sound of tinnitus.  Do not use stimulants, such as nicotine, alcohol, or caffeine. These substances may make tinnitus worse.  This information is not intended to replace advice given to you by your health care provider. Make sure you discuss any questions you have with your health care provider.

## 2023-11-06 NOTE — ED STATDOCS - OBJECTIVE STATEMENT
54 y/o female with PMHx of HTN presents to the ED with son johnathon Woodward c/o left ear hearing loss with associated discomfort while talking x3 days, describing the sensation as if a headphone has been placed over her left ear. Denies recent illnesses. Right ear feels normal.

## 2023-11-09 ENCOUNTER — APPOINTMENT (OUTPATIENT)
Dept: OTOLARYNGOLOGY | Facility: CLINIC | Age: 53
End: 2023-11-09
Payer: COMMERCIAL

## 2023-11-09 VITALS — BODY MASS INDEX: 19.66 KG/M2 | WEIGHT: 118 LBS | HEIGHT: 65 IN

## 2023-11-09 DIAGNOSIS — H93.12 TINNITUS, LEFT EAR: ICD-10-CM

## 2023-11-09 PROCEDURE — 92567 TYMPANOMETRY: CPT

## 2023-11-09 PROCEDURE — 92552 PURE TONE AUDIOMETRY AIR: CPT

## 2023-11-09 PROCEDURE — 99213 OFFICE O/P EST LOW 20 MIN: CPT | Mod: 25

## 2023-11-09 RX ORDER — PREDNISONE 20 MG/1
20 TABLET ORAL
Qty: 15 | Refills: 2 | Status: ACTIVE | COMMUNITY
Start: 2023-11-09 | End: 1900-01-01

## 2023-11-17 ENCOUNTER — APPOINTMENT (OUTPATIENT)
Dept: OTOLARYNGOLOGY | Facility: CLINIC | Age: 53
End: 2023-11-17
Payer: COMMERCIAL

## 2023-11-17 VITALS — WEIGHT: 118 LBS | HEIGHT: 65 IN | BODY MASS INDEX: 19.66 KG/M2

## 2023-11-17 DIAGNOSIS — H69.93 UNSPECIFIED EUSTACHIAN TUBE DISORDER, BILATERAL: ICD-10-CM

## 2023-11-17 DIAGNOSIS — H90.A22 SENSORINEURAL HEARING LOSS, UNILATERAL, LEFT EAR, WITH RESTRICTED HEARING ON THE CONTRALATERAL SIDE: ICD-10-CM

## 2023-11-17 PROCEDURE — 99213 OFFICE O/P EST LOW 20 MIN: CPT | Mod: 25

## 2023-11-17 PROCEDURE — 92557 COMPREHENSIVE HEARING TEST: CPT

## 2023-11-17 PROCEDURE — 92567 TYMPANOMETRY: CPT

## 2023-12-02 ENCOUNTER — APPOINTMENT (OUTPATIENT)
Dept: MRI IMAGING | Facility: CLINIC | Age: 53
End: 2023-12-02
Payer: COMMERCIAL

## 2023-12-02 ENCOUNTER — OUTPATIENT (OUTPATIENT)
Dept: OUTPATIENT SERVICES | Facility: HOSPITAL | Age: 53
LOS: 1 days | End: 2023-12-02
Payer: COMMERCIAL

## 2023-12-02 DIAGNOSIS — Z00.8 ENCOUNTER FOR OTHER GENERAL EXAMINATION: ICD-10-CM

## 2023-12-02 DIAGNOSIS — H90.A22 SENSORINEURAL HEARING LOSS, UNILATERAL, LEFT EAR, WITH RESTRICTED HEARING ON THE CONTRALATERAL SIDE: ICD-10-CM

## 2023-12-02 PROCEDURE — 70553 MRI BRAIN STEM W/O & W/DYE: CPT | Mod: 26

## 2023-12-02 PROCEDURE — A9585: CPT

## 2023-12-02 PROCEDURE — 70553 MRI BRAIN STEM W/O & W/DYE: CPT

## 2023-12-18 NOTE — CONSULT LETTER
[Dear  ___] : Dear  [unfilled], [Consult Letter:] : I had the pleasure of evaluating your patient, [unfilled]. [Please see my note below.] : Please see my note below. [Consult Closing:] : Thank you very much for allowing me to participate in the care of this patient.  If you have any questions, please do not hesitate to contact me. [Sincerely,] : Sincerely, 15

## 2023-12-19 NOTE — ED PROVIDER NOTE - ALLERGIC/IMMUNOLOGIC NEGATIVE STATEMENT, MLM
no dermatitis, no environmental allergies, no food allergies, no immunosuppressive disorder, and no pruritus.
Statement Selected

## 2023-12-27 NOTE — ED ADULT TRIAGE NOTE - HEIGHT IN FEET
Authorization on file [id number]    Called [facility] [phone]    Humira name brand is non-preferred    09/14/2022 no end date. Restricted to filling with [employer],  [employer] or [employer].    NEW as of 12/01/2023 HUMIRA NOT PREFERRED.     Bio similars preferred:   HADLIMA     Called patient regarding bio similar process.    Most recent dose was due about 1 week ago.     Initiated Humira bio similar process.    Thank You,     Marquita Zaldivar [facility]  Specialty [employer] Specialty  [email or web address]  [email or web address]  Phone: [phone]  Fax: [phone]             5

## 2024-04-18 ENCOUNTER — OUTPATIENT (OUTPATIENT)
Dept: OUTPATIENT SERVICES | Facility: HOSPITAL | Age: 54
LOS: 1 days | End: 2024-04-18
Payer: COMMERCIAL

## 2024-04-18 ENCOUNTER — APPOINTMENT (OUTPATIENT)
Dept: MAMMOGRAPHY | Facility: CLINIC | Age: 54
End: 2024-04-18
Payer: COMMERCIAL

## 2024-04-18 DIAGNOSIS — R92.30 DENSE BREASTS, UNSPECIFIED: ICD-10-CM

## 2024-04-18 DIAGNOSIS — Z12.31 ENCOUNTER FOR SCREENING MAMMOGRAM FOR MALIGNANT NEOPLASM OF BREAST: ICD-10-CM

## 2024-04-18 DIAGNOSIS — Z01.419 ENCOUNTER FOR GYNECOLOGICAL EXAMINATION (GENERAL) (ROUTINE) WITHOUT ABNORMAL FINDINGS: ICD-10-CM

## 2024-04-18 PROCEDURE — 77067 SCR MAMMO BI INCL CAD: CPT | Mod: 26

## 2024-04-18 PROCEDURE — 77063 BREAST TOMOSYNTHESIS BI: CPT

## 2024-04-18 PROCEDURE — 77067 SCR MAMMO BI INCL CAD: CPT

## 2024-04-18 PROCEDURE — 77063 BREAST TOMOSYNTHESIS BI: CPT | Mod: 26

## 2025-04-25 ENCOUNTER — OUTPATIENT (OUTPATIENT)
Dept: OUTPATIENT SERVICES | Facility: HOSPITAL | Age: 55
LOS: 1 days | End: 2025-04-25
Payer: COMMERCIAL

## 2025-04-25 ENCOUNTER — APPOINTMENT (OUTPATIENT)
Dept: MAMMOGRAPHY | Facility: CLINIC | Age: 55
End: 2025-04-25
Payer: COMMERCIAL

## 2025-04-25 DIAGNOSIS — Z00.8 ENCOUNTER FOR OTHER GENERAL EXAMINATION: ICD-10-CM

## 2025-04-25 PROCEDURE — 77063 BREAST TOMOSYNTHESIS BI: CPT

## 2025-04-25 PROCEDURE — 77063 BREAST TOMOSYNTHESIS BI: CPT | Mod: 26

## 2025-04-25 PROCEDURE — 77067 SCR MAMMO BI INCL CAD: CPT

## 2025-04-25 PROCEDURE — 77067 SCR MAMMO BI INCL CAD: CPT | Mod: 26

## 2025-05-03 ENCOUNTER — EMERGENCY (EMERGENCY)
Facility: HOSPITAL | Age: 55
LOS: 0 days | Discharge: HOME | End: 2025-05-03
Attending: EMERGENCY MEDICINE
Payer: COMMERCIAL

## 2025-05-03 VITALS
HEART RATE: 57 BPM | TEMPERATURE: 98 F | SYSTOLIC BLOOD PRESSURE: 122 MMHG | OXYGEN SATURATION: 100 % | DIASTOLIC BLOOD PRESSURE: 71 MMHG | RESPIRATION RATE: 16 BRPM | WEIGHT: 116.62 LBS
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
## 2025-05-03 DIAGNOSIS — I10 ESSENTIAL (PRIMARY) HYPERTENSION: ICD-10-CM
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
## 2025-05-03 PROCEDURE — 99282 EMERGENCY DEPT VISIT SF MDM: CPT

## 2025-05-03 PROCEDURE — 99283 EMERGENCY DEPT VISIT LOW MDM: CPT
